# Patient Record
Sex: FEMALE | Race: WHITE | Employment: OTHER | URBAN - METROPOLITAN AREA
[De-identification: names, ages, dates, MRNs, and addresses within clinical notes are randomized per-mention and may not be internally consistent; named-entity substitution may affect disease eponyms.]

---

## 2019-12-13 ENCOUNTER — OFFICE VISIT (OUTPATIENT)
Dept: OBGYN CLINIC | Facility: CLINIC | Age: 74
End: 2019-12-13
Payer: MEDICARE

## 2019-12-13 VITALS
HEIGHT: 57.5 IN | DIASTOLIC BLOOD PRESSURE: 62 MMHG | WEIGHT: 107.81 LBS | SYSTOLIC BLOOD PRESSURE: 130 MMHG | HEART RATE: 64 BPM | BODY MASS INDEX: 22.94 KG/M2

## 2019-12-13 DIAGNOSIS — Z12.39 BREAST CANCER SCREENING: ICD-10-CM

## 2019-12-13 DIAGNOSIS — Z12.31 ENCOUNTER FOR SCREENING MAMMOGRAM FOR MALIGNANT NEOPLASM OF BREAST: ICD-10-CM

## 2019-12-13 DIAGNOSIS — Z01.419 WELL WOMAN EXAM WITH ROUTINE GYNECOLOGICAL EXAM: Primary | ICD-10-CM

## 2019-12-13 PROCEDURE — G0101 CA SCREEN;PELVIC/BREAST EXAM: HCPCS | Performed by: OBSTETRICS & GYNECOLOGY

## 2019-12-13 RX ORDER — ATORVASTATIN CALCIUM 10 MG/1
10 TABLET, FILM COATED ORAL NIGHTLY
COMMUNITY
End: 2019-12-30

## 2019-12-13 RX ORDER — LISINOPRIL 10 MG/1
10 TABLET ORAL DAILY
COMMUNITY
End: 2019-12-30

## 2019-12-13 NOTE — PROGRESS NOTES
[facility]  Obstetrics and Gynecology  History & Physical    CC: Patient is a new patient and here for a well woman exam     Subjective:     HPI: Zohra Enamorado is a 69year old [employer] female here for a well women exam. Patient reports doing wel Sexual Activity      Alcohol use: Never        Frequency: Never      Drug use: Never      Sexual activity: Not Currently        Partners: Male    Lifestyle      Physical activity:        Days per week: Not on file        Minutes per session: Not on file constipation  :  denies dysuria, hematuria, increased urinary frequency.    Heme: denies history of excessive bleeding or bruising      Objective:      12/13/19  1016   BP: 130/62   Pulse: 64   Weight: 107 lb 12.8 oz (48.9 kg)   Height: 57.5\"         Bod

## 2019-12-30 ENCOUNTER — OFFICE VISIT (OUTPATIENT)
Dept: FAMILY MEDICINE CLINIC | Facility: CLINIC | Age: 74
End: 2019-12-30
Payer: MEDICARE

## 2019-12-30 VITALS
WEIGHT: 110 LBS | OXYGEN SATURATION: 95 % | HEART RATE: 83 BPM | DIASTOLIC BLOOD PRESSURE: 58 MMHG | BODY MASS INDEX: 23.09 KG/M2 | TEMPERATURE: 98 F | HEIGHT: 57.68 IN | SYSTOLIC BLOOD PRESSURE: 118 MMHG

## 2019-12-30 DIAGNOSIS — Z23 NEED FOR VACCINATION: ICD-10-CM

## 2019-12-30 DIAGNOSIS — I10 ESSENTIAL HYPERTENSION: ICD-10-CM

## 2019-12-30 DIAGNOSIS — Z00.00 MEDICARE ANNUAL WELLNESS VISIT, INITIAL: Primary | ICD-10-CM

## 2019-12-30 DIAGNOSIS — R21 RASH: ICD-10-CM

## 2019-12-30 DIAGNOSIS — E78.00 HYPERCHOLESTEROLEMIA: ICD-10-CM

## 2019-12-30 DIAGNOSIS — E11.9 TYPE 2 DIABETES MELLITUS WITHOUT COMPLICATION, WITHOUT LONG-TERM CURRENT USE OF INSULIN (HCC): ICD-10-CM

## 2019-12-30 PROCEDURE — 99214 OFFICE O/P EST MOD 30 MIN: CPT | Performed by: FAMILY MEDICINE

## 2019-12-30 PROCEDURE — G0009 ADMIN PNEUMOCOCCAL VACCINE: HCPCS | Performed by: FAMILY MEDICINE

## 2019-12-30 PROCEDURE — G0438 PPPS, INITIAL VISIT: HCPCS | Performed by: FAMILY MEDICINE

## 2019-12-30 PROCEDURE — 90670 PCV13 VACCINE IM: CPT | Performed by: FAMILY MEDICINE

## 2019-12-30 RX ORDER — METFORMIN HYDROCHLORIDE 500 MG/1
500 TABLET, EXTENDED RELEASE ORAL
Qty: 90 TABLET | Refills: 0 | Status: SHIPPED | OUTPATIENT
Start: 2019-12-30 | End: 2020-07-22

## 2019-12-30 RX ORDER — LISINOPRIL 10 MG/1
10 TABLET ORAL NIGHTLY
Qty: 90 TABLET | Refills: 1 | Status: SHIPPED | OUTPATIENT
Start: 2019-12-30 | End: 2020-06-16

## 2019-12-30 RX ORDER — ATORVASTATIN CALCIUM 10 MG/1
10 TABLET, FILM COATED ORAL NIGHTLY
Qty: 90 TABLET | Refills: 1 | Status: SHIPPED | OUTPATIENT
Start: 2019-12-30 | End: 2020-06-16

## 2019-12-30 RX ORDER — METHYLPREDNISOLONE 4 MG/1
TABLET ORAL
Qty: 1 KIT | Refills: 0 | Status: SHIPPED | OUTPATIENT
Start: 2019-12-30 | End: 2020-09-02 | Stop reason: ALTCHOICE

## 2019-12-30 NOTE — PROGRESS NOTES
HPI:   Steven Canales is a 69year old female who presents for a Medicare Initial Annual Wellness visit (Once after 12 month Medicare anniversary) Type 2 diabetes, hypertension, hyperlipidemia, rash. New patient. Type 2 Diabetes:  Status uncertain. hopeless (over the last two weeks)?: Several days  PHQ-2 SCORE: 1     Advanced Directive:   She does NOT have a Living Will on file in [address]    Advance care planning including the explanation and discussion of advance directives standard forms performed Fac one daily dose q am with food or milk for 6 days.   metFORMIN HCl  MG Oral Tablet 24 Hr, Take 1 tablet (500 mg total) by mouth daily with breakfast.       MEDICAL INFORMATION:   She  has a past medical history of Diabetes ([address] HTN (hypertension), Hy canals, both ears   Nose: Nares normal, septum midline,mucosa normal, no drainage or sinus tenderness   Throat:  Moist mucous membranes.   Posterior oropharynx normal.   Neck: Supple, symmetrical, trachea midline, no adenopathy;  thyroid: not enlarged, symm review. Continue metformin. Continue ACE inhibitor for renal protection. Continue atorvastatin. - CBC WITH DIFFERENTIAL WITH PLATELET; Future  - COMP METABOLIC PANEL (14); Future  - LIPID PANEL;  Future  - ASSAY, THYROID STIM HORMONE; Future  - FREE T dose as one daily dose q am with food or milk for 6 days.    • metFORMIN HCl  MG Oral Tablet 24 Hr 90 tablet 0     Sig: Take 1 tablet (500 mg total) by mouth daily with breakfast.       Imaging & Consults:  PNEUMOCOCCAL VACC, 13 REN IM      PLAN SUMMA Visit Annually: Diabetics, FHx Glaucoma, AA>50, > 65 Data entered on: 11/19/2019   Last Dilated Eye Exam 11/19/2019       Bone Density Screening      Dexascan Every two years No results found for this or any previous visit. No flowsheet data found. anticonvulsants.)    Potassium  Annually Potassium (mmol/L)   Date Value   12/31/2019 4.7    No flowsheet data found. Creatinine  Annually Creatinine (mg/dL)   Date Value   12/31/2019 0.88    No flowsheet data found.     Drug Serum Conc  Annually No resu

## 2019-12-31 ENCOUNTER — LAB ENCOUNTER (OUTPATIENT)
Dept: LAB | Age: 74
End: 2019-12-31
Attending: FAMILY MEDICINE
Payer: MEDICARE

## 2019-12-31 DIAGNOSIS — E11.9 TYPE 2 DIABETES MELLITUS WITHOUT COMPLICATION, WITHOUT LONG-TERM CURRENT USE OF INSULIN (HCC): ICD-10-CM

## 2019-12-31 LAB
ALBUMIN SERPL-MCNC: 3.6 G/DL (ref 3.4–5)
ALBUMIN/GLOB SERPL: 0.9 {RATIO} (ref 1–2)
ALP LIVER SERPL-CCNC: 118 U/L (ref 55–142)
ALT SERPL-CCNC: 27 U/L (ref 13–56)
ANION GAP SERPL CALC-SCNC: 4 MMOL/L (ref 0–18)
AST SERPL-CCNC: 19 U/L (ref 15–37)
BASOPHILS # BLD AUTO: 0.06 X10(3) UL (ref 0–0.2)
BASOPHILS NFR BLD AUTO: 0.7 %
BILIRUB SERPL-MCNC: 0.6 MG/DL (ref 0.1–2)
BUN BLD-MCNC: 16 MG/DL (ref 7–18)
BUN/CREAT SERPL: 18.2 (ref 10–20)
CALCIUM BLD-MCNC: 9.3 MG/DL (ref 8.5–10.1)
CHLORIDE SERPL-SCNC: 106 MMOL/L (ref 98–112)
CHOLEST SMN-MCNC: 231 MG/DL (ref ?–200)
CO2 SERPL-SCNC: 28 MMOL/L (ref 21–32)
CREAT BLD-MCNC: 0.88 MG/DL (ref 0.55–1.02)
CREAT UR-SCNC: 57.6 MG/DL
DEPRECATED RDW RBC AUTO: 48.5 FL (ref 35.1–46.3)
EOSINOPHIL # BLD AUTO: 0.32 X10(3) UL (ref 0–0.7)
EOSINOPHIL NFR BLD AUTO: 3.9 %
ERYTHROCYTE [DISTWIDTH] IN BLOOD BY AUTOMATED COUNT: 14.3 % (ref 11–15)
EST. AVERAGE GLUCOSE BLD GHB EST-MCNC: 134 MG/DL (ref 68–126)
GLOBULIN PLAS-MCNC: 3.8 G/DL (ref 2.8–4.4)
GLUCOSE BLD-MCNC: 99 MG/DL (ref 70–99)
HBA1C MFR BLD HPLC: 6.3 % (ref ?–5.7)
HCT VFR BLD AUTO: 42.2 % (ref 35–48)
HDLC SERPL-MCNC: 73 MG/DL (ref 40–59)
HGB BLD-MCNC: 13.4 G/DL (ref 12–16)
IMM GRANULOCYTES # BLD AUTO: 0.03 X10(3) UL (ref 0–1)
IMM GRANULOCYTES NFR BLD: 0.4 %
LDLC SERPL CALC-MCNC: 128 MG/DL (ref ?–100)
LYMPHOCYTES # BLD AUTO: 1.12 X10(3) UL (ref 1–4)
LYMPHOCYTES NFR BLD AUTO: 13.7 %
M PROTEIN MFR SERPL ELPH: 7.4 G/DL (ref 6.4–8.2)
MCH RBC QN AUTO: 29.7 PG (ref 26–34)
MCHC RBC AUTO-ENTMCNC: 31.8 G/DL (ref 31–37)
MCV RBC AUTO: 93.6 FL (ref 80–100)
MICROALBUMIN UR-MCNC: <0.5 MG/DL
MONOCYTES # BLD AUTO: 0.46 X10(3) UL (ref 0.1–1)
MONOCYTES NFR BLD AUTO: 5.6 %
NEUTROPHILS # BLD AUTO: 6.19 X10 (3) UL (ref 1.5–7.7)
NEUTROPHILS # BLD AUTO: 6.19 X10(3) UL (ref 1.5–7.7)
NEUTROPHILS NFR BLD AUTO: 75.7 %
NONHDLC SERPL-MCNC: 158 MG/DL (ref ?–130)
OSMOLALITY SERPL CALC.SUM OF ELEC: 287 MOSM/KG (ref 275–295)
PATIENT FASTING Y/N/NP: YES
PATIENT FASTING Y/N/NP: YES
PLATELET # BLD AUTO: 229 10(3)UL (ref 150–450)
POTASSIUM SERPL-SCNC: 4.7 MMOL/L (ref 3.5–5.1)
RBC # BLD AUTO: 4.51 X10(6)UL (ref 3.8–5.3)
SODIUM SERPL-SCNC: 138 MMOL/L (ref 136–145)
T4 FREE SERPL-MCNC: 0.9 NG/DL (ref 0.8–1.7)
TRIGL SERPL-MCNC: 148 MG/DL (ref 30–149)
TSI SER-ACNC: 1.17 MIU/ML (ref 0.36–3.74)
VLDLC SERPL CALC-MCNC: 30 MG/DL (ref 0–30)
WBC # BLD AUTO: 8.2 X10(3) UL (ref 4–11)

## 2019-12-31 PROCEDURE — 36415 COLL VENOUS BLD VENIPUNCTURE: CPT

## 2019-12-31 PROCEDURE — 84443 ASSAY THYROID STIM HORMONE: CPT

## 2019-12-31 PROCEDURE — 85025 COMPLETE CBC W/AUTO DIFF WBC: CPT

## 2019-12-31 PROCEDURE — 82043 UR ALBUMIN QUANTITATIVE: CPT

## 2019-12-31 PROCEDURE — 80061 LIPID PANEL: CPT

## 2019-12-31 PROCEDURE — 82570 ASSAY OF URINE CREATININE: CPT

## 2019-12-31 PROCEDURE — 80053 COMPREHEN METABOLIC PANEL: CPT

## 2019-12-31 PROCEDURE — 84439 ASSAY OF FREE THYROXINE: CPT

## 2019-12-31 PROCEDURE — 83036 HEMOGLOBIN GLYCOSYLATED A1C: CPT

## 2020-01-07 ENCOUNTER — MED REC SCAN ONLY (OUTPATIENT)
Dept: FAMILY MEDICINE CLINIC | Facility: CLINIC | Age: 75
End: 2020-01-07

## 2020-06-16 DIAGNOSIS — E11.9 TYPE 2 DIABETES MELLITUS WITHOUT COMPLICATION, WITHOUT LONG-TERM CURRENT USE OF INSULIN (HCC): ICD-10-CM

## 2020-06-16 DIAGNOSIS — I10 ESSENTIAL HYPERTENSION: ICD-10-CM

## 2020-06-16 DIAGNOSIS — E78.00 HYPERCHOLESTEROLEMIA: ICD-10-CM

## 2020-06-16 RX ORDER — ATORVASTATIN CALCIUM 10 MG/1
TABLET, FILM COATED ORAL
Qty: 90 TABLET | Refills: 0 | Status: SHIPPED | OUTPATIENT
Start: 2020-06-16 | End: 2020-07-20

## 2020-06-16 RX ORDER — LISINOPRIL 10 MG/1
TABLET ORAL
Qty: 90 TABLET | Refills: 0 | Status: SHIPPED | OUTPATIENT
Start: 2020-06-16 | End: 2020-09-02

## 2020-06-16 NOTE — TELEPHONE ENCOUNTER
Pt requesting refill of LISINOPRIL and ATORVASTATIN     Passed protocol, refill approved, sent to pharmacy

## 2020-07-17 DIAGNOSIS — I10 ESSENTIAL HYPERTENSION: ICD-10-CM

## 2020-07-17 DIAGNOSIS — E78.00 HYPERCHOLESTEROLEMIA: ICD-10-CM

## 2020-07-17 DIAGNOSIS — E11.9 TYPE 2 DIABETES MELLITUS WITHOUT COMPLICATION, WITHOUT LONG-TERM CURRENT USE OF INSULIN (HCC): ICD-10-CM

## 2020-07-20 RX ORDER — ATORVASTATIN CALCIUM 10 MG/1
TABLET, FILM COATED ORAL
Qty: 90 TABLET | Refills: 0 | Status: SHIPPED | OUTPATIENT
Start: 2020-07-20 | End: 2020-09-02

## 2020-07-20 RX ORDER — LISINOPRIL 10 MG/1
TABLET ORAL
Qty: 90 TABLET | Refills: 0 | OUTPATIENT
Start: 2020-07-20

## 2020-07-22 DIAGNOSIS — E11.9 TYPE 2 DIABETES MELLITUS WITHOUT COMPLICATION, WITHOUT LONG-TERM CURRENT USE OF INSULIN (HCC): ICD-10-CM

## 2020-07-22 RX ORDER — METFORMIN HYDROCHLORIDE 500 MG/1
500 TABLET, EXTENDED RELEASE ORAL
Qty: 90 TABLET | Refills: 0 | Status: SHIPPED | OUTPATIENT
Start: 2020-07-22 | End: 2020-09-02

## 2020-07-22 NOTE — TELEPHONE ENCOUNTER
Refill request for metformin. Did not meet protocol d/t no appointment in past 6 months. Called patient and spoke to daughter. She stated they changed insurance and doesn't think Dr. Shelbie Lange is covered anymore.   Verified with [facility] [place] that we do not accept

## 2020-09-02 ENCOUNTER — OFFICE VISIT (OUTPATIENT)
Dept: FAMILY MEDICINE CLINIC | Facility: CLINIC | Age: 75
End: 2020-09-02
Payer: MEDICAID

## 2020-09-02 ENCOUNTER — DOCUMENTATION ONLY (OUTPATIENT)
Dept: FAMILY MEDICINE CLINIC | Facility: CLINIC | Age: 75
End: 2020-09-02

## 2020-09-02 VITALS
HEART RATE: 71 BPM | SYSTOLIC BLOOD PRESSURE: 122 MMHG | WEIGHT: 120.63 LBS | TEMPERATURE: 98 F | HEIGHT: 57.68 IN | DIASTOLIC BLOOD PRESSURE: 60 MMHG | BODY MASS INDEX: 25.32 KG/M2 | OXYGEN SATURATION: 98 %

## 2020-09-02 DIAGNOSIS — I10 ESSENTIAL HYPERTENSION: ICD-10-CM

## 2020-09-02 DIAGNOSIS — J30.89 PERENNIAL ALLERGIC RHINITIS WITH SEASONAL VARIATION: ICD-10-CM

## 2020-09-02 DIAGNOSIS — J30.2 PERENNIAL ALLERGIC RHINITIS WITH SEASONAL VARIATION: ICD-10-CM

## 2020-09-02 DIAGNOSIS — E78.00 HYPERCHOLESTEROLEMIA: ICD-10-CM

## 2020-09-02 DIAGNOSIS — E11.9 TYPE 2 DIABETES MELLITUS WITHOUT COMPLICATION, WITHOUT LONG-TERM CURRENT USE OF INSULIN (HCC): Primary | ICD-10-CM

## 2020-09-02 LAB
CARTRIDGE LOT#: 564 NUMERIC
HEMOGLOBIN A1C: 6.1 % (ref 4.3–5.6)

## 2020-09-02 PROCEDURE — 99214 OFFICE O/P EST MOD 30 MIN: CPT | Performed by: FAMILY MEDICINE

## 2020-09-02 PROCEDURE — 3074F SYST BP LT 130 MM HG: CPT | Performed by: FAMILY MEDICINE

## 2020-09-02 PROCEDURE — 83036 HEMOGLOBIN GLYCOSYLATED A1C: CPT | Performed by: FAMILY MEDICINE

## 2020-09-02 PROCEDURE — 3008F BODY MASS INDEX DOCD: CPT | Performed by: FAMILY MEDICINE

## 2020-09-02 PROCEDURE — 93000 ELECTROCARDIOGRAM COMPLETE: CPT | Performed by: FAMILY MEDICINE

## 2020-09-02 PROCEDURE — 3078F DIAST BP <80 MM HG: CPT | Performed by: FAMILY MEDICINE

## 2020-09-02 RX ORDER — LISINOPRIL 10 MG/1
10 TABLET ORAL NIGHTLY
Qty: 90 TABLET | Refills: 3 | Status: SHIPPED | OUTPATIENT
Start: 2020-09-02 | End: 2021-09-07

## 2020-09-02 RX ORDER — LISINOPRIL 10 MG/1
10 TABLET ORAL EVERY EVENING
Qty: 90 TABLET | Refills: 0 | Status: CANCELLED | OUTPATIENT
Start: 2020-09-02

## 2020-09-02 RX ORDER — BLOOD SUGAR DIAGNOSTIC
STRIP MISCELLANEOUS
Qty: 100 STRIP | Refills: 3 | Status: SHIPPED | OUTPATIENT
Start: 2020-09-02 | End: 2021-12-16

## 2020-09-02 RX ORDER — METFORMIN HYDROCHLORIDE 500 MG/1
500 TABLET, EXTENDED RELEASE ORAL
Qty: 90 TABLET | Refills: 3 | Status: SHIPPED | OUTPATIENT
Start: 2020-09-02 | End: 2021-09-07

## 2020-09-02 RX ORDER — LANCETS
1 EACH MISCELLANEOUS EVERY MORNING
Qty: 1 BOX | Refills: 3 | Status: SHIPPED | OUTPATIENT
Start: 2020-09-02 | End: 2021-12-16

## 2020-09-02 RX ORDER — ATORVASTATIN CALCIUM 10 MG/1
10 TABLET, FILM COATED ORAL NIGHTLY
Qty: 90 TABLET | Refills: 3 | Status: SHIPPED | OUTPATIENT
Start: 2020-09-02 | End: 2021-12-16

## 2020-09-02 RX ORDER — BLOOD-GLUCOSE METER
1 EACH MISCELLANEOUS EVERY MORNING
Qty: 1 KIT | Refills: 0 | Status: SHIPPED | OUTPATIENT
Start: 2020-09-02 | End: 2021-12-16

## 2020-09-02 NOTE — PATIENT INSTRUCTIONS
-Please try to complete the screening mammogram that Dr. Jackee Nyhan ordered.    -For nasal allergies, try taking over-the-counter Clarinex or Zyrtec at bedtime until the first frost.    -You can check your blood sugar daily in the morning befor

## 2020-09-02 NOTE — PROGRESS NOTES
HPI:   Nelly Martinez is a 77year old female    Patient is accompanied by her niece who is pleasant and supportive.         Immunization History  Administered            Date(s) Administered    FLUAD High Dose 72 yr and older (68979) heartburn  EXTREMITIES: denies swelling, numbness and tingling  NEURO: denies headaches  ENDO:  Denies polyuria/polydypsia/heat or cold intolerance  PSYCH:  Denies depression or anxiety    EXAM:   /60 (BP Location: Left arm, Patient Position: Sitting Cholesterol, Total      <200 mg/dL  231 (H)   HDL Cholesterol      40 - 59 mg/dL  73 (H)   Triglycerides      30 - 149 mg/dL  148   LDL Cholesterol Calc      <100 mg/dL  128 (H)   VLDL      0 - 30 mg/dL  30   NON HDL CHOL      <130 mg/dL  158 (H)   HEMOG Dispense: 1 kit; Refill: 0  - Glucose Blood (ACCU-CHEK RICKY PLUS) In Vitro Strip; Use as directed. Dispense: 100 strip; Refill: 3  - Accu-Chek Softclix Lancets Does not apply Misc; 1 lancet by Finger stick route every morning. Use as directed.   Dispense: apply Kit 1 kit 0     Si Device by Other route every morning. • Glucose Blood (ACCU-CHEK RICKY PLUS) In Vitro Strip 100 strip 3     Sig: Use as directed.    • Accu-Chek Softclix Lancets Does not apply Misc 1 Box 3     Si lancet by Finger stick rou

## 2020-11-03 ENCOUNTER — LAB ENCOUNTER (OUTPATIENT)
Dept: LAB | Age: 75
End: 2020-11-03
Attending: FAMILY MEDICINE
Payer: MEDICARE

## 2020-11-03 DIAGNOSIS — J30.89 PERENNIAL ALLERGIC RHINITIS WITH SEASONAL VARIATION: ICD-10-CM

## 2020-11-03 DIAGNOSIS — E78.00 HYPERCHOLESTEROLEMIA: ICD-10-CM

## 2020-11-03 DIAGNOSIS — E11.9 TYPE 2 DIABETES MELLITUS WITHOUT COMPLICATION, WITHOUT LONG-TERM CURRENT USE OF INSULIN (HCC): ICD-10-CM

## 2020-11-03 DIAGNOSIS — I10 ESSENTIAL HYPERTENSION: ICD-10-CM

## 2020-11-03 DIAGNOSIS — J30.2 PERENNIAL ALLERGIC RHINITIS WITH SEASONAL VARIATION: ICD-10-CM

## 2020-11-03 PROCEDURE — 82785 ASSAY OF IGE: CPT

## 2020-11-03 PROCEDURE — 85025 COMPLETE CBC W/AUTO DIFF WBC: CPT

## 2020-11-03 PROCEDURE — 82570 ASSAY OF URINE CREATININE: CPT

## 2020-11-03 PROCEDURE — 86003 ALLG SPEC IGE CRUDE XTRC EA: CPT

## 2020-11-03 PROCEDURE — 80061 LIPID PANEL: CPT

## 2020-11-03 PROCEDURE — 36415 COLL VENOUS BLD VENIPUNCTURE: CPT

## 2020-11-03 PROCEDURE — 80053 COMPREHEN METABOLIC PANEL: CPT

## 2020-11-03 PROCEDURE — 84443 ASSAY THYROID STIM HORMONE: CPT

## 2020-11-03 PROCEDURE — 84439 ASSAY OF FREE THYROXINE: CPT

## 2020-11-03 PROCEDURE — 82043 UR ALBUMIN QUANTITATIVE: CPT

## 2020-11-13 ENCOUNTER — TELEPHONE (OUTPATIENT)
Dept: FAMILY MEDICINE CLINIC | Facility: CLINIC | Age: 75
End: 2020-11-13

## 2020-12-17 ENCOUNTER — OFFICE VISIT (OUTPATIENT)
Dept: FAMILY MEDICINE CLINIC | Facility: CLINIC | Age: 75
End: 2020-12-17
Payer: MEDICAID

## 2020-12-17 DIAGNOSIS — U07.1 COVID-19: Primary | ICD-10-CM

## 2020-12-17 DIAGNOSIS — R06.00 DOE (DYSPNEA ON EXERTION): ICD-10-CM

## 2020-12-17 PROCEDURE — 99214 OFFICE O/P EST MOD 30 MIN: CPT | Performed by: FAMILY MEDICINE

## 2020-12-17 RX ORDER — ALBUTEROL SULFATE 90 UG/1
2 AEROSOL, METERED RESPIRATORY (INHALATION) EVERY 6 HOURS PRN
Qty: 1 INHALER | Refills: 0 | Status: SHIPPED | OUTPATIENT
Start: 2020-12-17 | End: 2021-01-25

## 2020-12-17 NOTE — PROGRESS NOTES
Virtual/Telephone Check-In    Harika Matias is a 77year old female here today for a telemedicine audio only visit. HPI:       1. COVID-19  2.  PACKER (dyspnea on exertion)  -diagnosed 1 month ago  -cough and fevers resolved  -however, continues to have Refill   • atorvastatin 10 MG Oral Tab Take 1 tablet (10 mg total) by mouth nightly.  90 tablet 3   • metFORMIN HCl  MG Oral Tablet 24 Hr Take 1 tablet (500 mg total) by mouth daily with breakfast. 90 tablet 3   • lisinopril 10 MG Oral Tab Take 1 tabl

## 2021-01-25 ENCOUNTER — OFFICE VISIT (OUTPATIENT)
Dept: FAMILY MEDICINE CLINIC | Facility: CLINIC | Age: 76
End: 2021-01-25
Payer: MEDICARE

## 2021-01-25 VITALS
WEIGHT: 118 LBS | TEMPERATURE: 98 F | SYSTOLIC BLOOD PRESSURE: 122 MMHG | BODY MASS INDEX: 24.43 KG/M2 | OXYGEN SATURATION: 98 % | HEIGHT: 58.27 IN | HEART RATE: 82 BPM | DIASTOLIC BLOOD PRESSURE: 60 MMHG

## 2021-01-25 DIAGNOSIS — I10 ESSENTIAL HYPERTENSION: ICD-10-CM

## 2021-01-25 DIAGNOSIS — E78.00 HYPERCHOLESTEROLEMIA: ICD-10-CM

## 2021-01-25 DIAGNOSIS — J30.2 PERENNIAL ALLERGIC RHINITIS WITH SEASONAL VARIATION: ICD-10-CM

## 2021-01-25 DIAGNOSIS — B94.8 POST-VIRAL DISORDER: ICD-10-CM

## 2021-01-25 DIAGNOSIS — Z00.00 ENCOUNTER FOR ANNUAL HEALTH EXAMINATION: Primary | ICD-10-CM

## 2021-01-25 DIAGNOSIS — Z78.0 POST-MENOPAUSE: ICD-10-CM

## 2021-01-25 DIAGNOSIS — Z12.11 ENCOUNTER FOR SCREENING FOR MALIGNANT NEOPLASM OF COLON: ICD-10-CM

## 2021-01-25 DIAGNOSIS — E11.9 TYPE 2 DIABETES MELLITUS WITHOUT COMPLICATION, WITHOUT LONG-TERM CURRENT USE OF INSULIN (HCC): ICD-10-CM

## 2021-01-25 DIAGNOSIS — Z11.59 NEED FOR HEPATITIS C SCREENING TEST: ICD-10-CM

## 2021-01-25 DIAGNOSIS — Z12.31 ENCOUNTER FOR SCREENING MAMMOGRAM FOR MALIGNANT NEOPLASM OF BREAST: ICD-10-CM

## 2021-01-25 DIAGNOSIS — R06.00 DOE (DYSPNEA ON EXERTION): ICD-10-CM

## 2021-01-25 DIAGNOSIS — J30.89 PERENNIAL ALLERGIC RHINITIS WITH SEASONAL VARIATION: ICD-10-CM

## 2021-01-25 PROCEDURE — 96160 PT-FOCUSED HLTH RISK ASSMT: CPT | Performed by: FAMILY MEDICINE

## 2021-01-25 PROCEDURE — 3074F SYST BP LT 130 MM HG: CPT | Performed by: FAMILY MEDICINE

## 2021-01-25 PROCEDURE — 99397 PER PM REEVAL EST PAT 65+ YR: CPT | Performed by: FAMILY MEDICINE

## 2021-01-25 PROCEDURE — 3078F DIAST BP <80 MM HG: CPT | Performed by: FAMILY MEDICINE

## 2021-01-25 PROCEDURE — 3008F BODY MASS INDEX DOCD: CPT | Performed by: FAMILY MEDICINE

## 2021-01-25 PROCEDURE — G0439 PPPS, SUBSEQ VISIT: HCPCS | Performed by: FAMILY MEDICINE

## 2021-01-25 RX ORDER — ALBUTEROL SULFATE 90 UG/1
2 AEROSOL, METERED RESPIRATORY (INHALATION) EVERY 6 HOURS PRN
Qty: 1 INHALER | Refills: 0 | Status: SHIPPED | OUTPATIENT
Start: 2021-01-25 | End: 2021-03-03

## 2021-01-25 NOTE — PATIENT INSTRUCTIONS
Va a lab en ayunas     Llama para citas de las pruebas    Regresa en 6 meses, antes si Sanchez Negrete's SCREENING SCHEDULE   Tests on this list are recommended by your physician but may not be covered, or covered at this frequency, by your ins a family history    Colorectal Cancer Screening  Covered up to Age 76     Colonoscopy Screen   Covered every 10 years- more often if abnormal Colonoscopy due on 08/27/1996 Update Health Maintenance if applicable    Flex Sigmoidoscopy Screen  Covered every after 65 Orders placed or performed in visit on 12/30/19   • PNEUMOCOCCAL VACC, 13 REN IM    Please get once after your 65th birthday    Pneumococcal 23 (Pneumovax)  Covered Once after 65 No orders found for this or any previous visit.  Please get once afte

## 2021-01-26 NOTE — PROGRESS NOTES
HPI:   Boo Simeon is a 77year old female who presents for a MA (Medicare Advantage) Supervisit (Once per calendar year). Annual Physical due on 01/25/2022     1. Encounter for annual health examination  2.  Encounter for screening for malignant neop and Family/surrogate (if present), and forms available to patient in AVS       She has never smoked tobacco.    CAGE Alcohol screening   Latoyameri Dooley was screened for Alcohol abuse and had a score of 0 so is at low risk.     Patient Care Team: Patient Car Softclix Lancets Does not apply Misc, 1 lancet by Finger stick route every morning. Use as directed. MEDICAL INFORMATION:   She  has a past medical history of Diabetes ([address] HTN (hypertension), Hypercholesteremia, and Prediabetes.     She  has a past no organomegaly   Pelvic: Deferred   Extremities: Extremities normal, atraumatic, no cyanosis or edema   Pulses: 2+ and symmetric   Skin: Skin color, texture, turgor normal, no rashes or lesions   Lymph nodes: Cervical, supraclavicular, and axillary nodes Inhalation Aero Soln; Inhale 2 puffs into the lungs every 6 (six) hours as needed for Wheezing (or cough).       Perennial allergic rhinitis with seasonal variation  -stable, will continue to monitor           Diet assessment: good     PLAN:  The patient in Eye Exam 11/19/2019       Bone Density Screening      Dexascan Every two years No results found for this or any previous visit. No flowsheet data found.     Pap and Pelvic      Pap: Every 3 yrs age 21-65 or Pap+HPV every 5 yrs age 33-67, age 72 and older at Creatinine  Annually Creatinine (mg/dL)   Date Value   11/03/2020 0.94    No flowsheet data found. Drug Serum Conc  Annually No results found for: DIGOXIN, DIG, VALP No flowsheet data found.        Diabetes      HgbA1C  Annually HEMOGLOBIN A1C (%)   Date

## 2021-02-04 ENCOUNTER — HOSPITAL ENCOUNTER (OUTPATIENT)
Dept: BONE DENSITY | Age: 76
Discharge: HOME | End: 2021-02-04
Attending: FAMILY MEDICINE
Payer: MEDICARE

## 2021-02-04 DIAGNOSIS — Z78.0 POST-MENOPAUSE: ICD-10-CM

## 2021-02-04 PROCEDURE — 77080 DXA BONE DENSITY AXIAL: CPT | Performed by: FAMILY MEDICINE

## 2021-02-08 ENCOUNTER — HOSPITAL ENCOUNTER (OUTPATIENT)
Dept: MAMMOGRAPHY | Age: 76
Discharge: HOME | End: 2021-02-08
Attending: FAMILY MEDICINE
Payer: MEDICARE

## 2021-02-08 DIAGNOSIS — Z78.0 POST-MENOPAUSE: ICD-10-CM

## 2021-02-08 DIAGNOSIS — M81.0 OSTEOPOROSIS, UNSPECIFIED OSTEOPOROSIS TYPE, UNSPECIFIED PATHOLOGICAL FRACTURE PRESENCE: Primary | ICD-10-CM

## 2021-02-08 DIAGNOSIS — Z12.31 ENCOUNTER FOR SCREENING MAMMOGRAM FOR MALIGNANT NEOPLASM OF BREAST: ICD-10-CM

## 2021-02-08 PROCEDURE — 77063 BREAST TOMOSYNTHESIS BI: CPT | Performed by: FAMILY MEDICINE

## 2021-02-08 PROCEDURE — 77067 SCR MAMMO BI INCL CAD: CPT | Performed by: FAMILY MEDICINE

## 2021-02-08 RX ORDER — ALENDRONATE SODIUM 70 MG/1
70 TABLET ORAL WEEKLY
Qty: 12 TABLET | Refills: 3 | Status: SHIPPED | OUTPATIENT
Start: 2021-02-08 | End: 2021-12-16

## 2021-02-12 ENCOUNTER — TELEPHONE (OUTPATIENT)
Dept: FAMILY MEDICINE CLINIC | Facility: CLINIC | Age: 76
End: 2021-02-12

## 2021-02-12 NOTE — TELEPHONE ENCOUNTER
Howard Mendoza MD   2/8/2021 11:26 AM      Bone density test shows osteoporosis     Start Calcium and Vit D supplements.  Goal is about 1200mg calcium daily (mostly through diet if possible, ok to use OTC vitamins as well) and Vit D 1000 units per day (O

## 2021-02-12 NOTE — TELEPHONE ENCOUNTER
Patient verbalized understanding and agreed with Dr. Johan Joseph instructions. She started taking medication as of Tuesday. Patient will go to store to buy vit D and calcium.    Patient unable to schedule appointment at the moment but will call back to schedule 3

## 2021-03-02 NOTE — TELEPHONE ENCOUNTER
Albuterol Sulfate  (90 Base) MCG/ACT Inhalation Aero Soln 1 Inhaler 0 1/25/2021     LOV 1/25/21- Wellness  Follow up in 6 months

## 2021-03-03 DIAGNOSIS — Z23 NEED FOR VACCINATION: ICD-10-CM

## 2021-03-03 RX ORDER — ALBUTEROL SULFATE 90 UG/1
AEROSOL, METERED RESPIRATORY (INHALATION)
Qty: 8.5 G | Refills: 0 | Status: SHIPPED | OUTPATIENT
Start: 2021-03-03

## 2021-04-26 ENCOUNTER — LAB ENCOUNTER (OUTPATIENT)
Dept: LAB | Age: 76
End: 2021-04-26
Attending: INTERNAL MEDICINE
Payer: MEDICARE

## 2021-04-26 DIAGNOSIS — Z12.11 SCREENING FOR COLON CANCER: ICD-10-CM

## 2021-04-29 ENCOUNTER — ANESTHESIA (OUTPATIENT)
Dept: ENDOSCOPY | Facility: HOSPITAL | Age: 76
End: 2021-04-29
Payer: MEDICARE

## 2021-04-29 ENCOUNTER — HOSPITAL ENCOUNTER (OUTPATIENT)
Facility: HOSPITAL | Age: 76
Setting detail: HOSPITAL OUTPATIENT SURGERY
Discharge: HOME | End: 2021-04-29
Attending: INTERNAL MEDICINE | Admitting: INTERNAL MEDICINE
Payer: MEDICARE

## 2021-04-29 ENCOUNTER — ANESTHESIA EVENT (OUTPATIENT)
Dept: ENDOSCOPY | Facility: HOSPITAL | Age: 76
End: 2021-04-29
Payer: MEDICARE

## 2021-04-29 VITALS
DIASTOLIC BLOOD PRESSURE: 71 MMHG | HEIGHT: 58 IN | TEMPERATURE: 98 F | HEART RATE: 71 BPM | OXYGEN SATURATION: 100 % | SYSTOLIC BLOOD PRESSURE: 131 MMHG | RESPIRATION RATE: 20 BRPM | BODY MASS INDEX: 19.94 KG/M2 | WEIGHT: 95 LBS

## 2021-04-29 DIAGNOSIS — Z12.11 SCREENING FOR COLON CANCER: Primary | ICD-10-CM

## 2021-04-29 PROCEDURE — 0DBN8ZX EXCISION OF SIGMOID COLON, VIA NATURAL OR ARTIFICIAL OPENING ENDOSCOPIC, DIAGNOSTIC: ICD-10-PCS | Performed by: INTERNAL MEDICINE

## 2021-04-29 PROCEDURE — 0DBK8ZX EXCISION OF ASCENDING COLON, VIA NATURAL OR ARTIFICIAL OPENING ENDOSCOPIC, DIAGNOSTIC: ICD-10-PCS | Performed by: INTERNAL MEDICINE

## 2021-04-29 PROCEDURE — 88305 TISSUE EXAM BY PATHOLOGIST: CPT | Performed by: INTERNAL MEDICINE

## 2021-04-29 PROCEDURE — 0DBL8ZX EXCISION OF TRANSVERSE COLON, VIA NATURAL OR ARTIFICIAL OPENING ENDOSCOPIC, DIAGNOSTIC: ICD-10-PCS | Performed by: INTERNAL MEDICINE

## 2021-04-29 PROCEDURE — 82962 GLUCOSE BLOOD TEST: CPT

## 2021-04-29 RX ORDER — LIDOCAINE HYDROCHLORIDE 10 MG/ML
INJECTION, SOLUTION EPIDURAL; INFILTRATION; INTRACAUDAL; PERINEURAL AS NEEDED
Status: DISCONTINUED | OUTPATIENT
Start: 2021-04-29 | End: 2021-04-29 | Stop reason: SURG

## 2021-04-29 RX ORDER — DEXTROSE MONOHYDRATE 25 G/50ML
50 INJECTION, SOLUTION INTRAVENOUS
Status: DISCONTINUED | OUTPATIENT
Start: 2021-04-29 | End: 2021-04-29

## 2021-04-29 RX ORDER — SODIUM CHLORIDE, SODIUM LACTATE, POTASSIUM CHLORIDE, CALCIUM CHLORIDE 600; 310; 30; 20 MG/100ML; MG/100ML; MG/100ML; MG/100ML
INJECTION, SOLUTION INTRAVENOUS CONTINUOUS
Status: DISCONTINUED | OUTPATIENT
Start: 2021-04-29 | End: 2021-04-29

## 2021-04-29 RX ADMIN — LIDOCAINE HYDROCHLORIDE 100 MG: 10 INJECTION, SOLUTION EPIDURAL; INFILTRATION; INTRACAUDAL; PERINEURAL at 14:59:00

## 2021-04-29 NOTE — H&P
[facility] Patient Status:  Hospital Outpatient Surgery    1945 MRN [id number]   Location [id number] [place] [address] Attending Stefanie Dupree MD   Date 2021 PCP Micehlle Mckee MD     CC:  Sc dry.  Laboratory Data:  Lab Results   Component Value Date    PGLU 106 04/29/2021         Assessment/Plan/Procedure:  Patient Active Problem List:     Type 2 diabetes mellitus without complication, without long-term current use of insulin (HCC)     Rash

## 2021-04-29 NOTE — ANESTHESIA POSTPROCEDURE EVALUATION
[facility] Patient Status:  Hospital Outpatient Surgery   Age/Gender 77year old female MRN [id number]   Location [address] [address] Attending Aubrey Dial MD   Hosp Day # 0 PCP Mary Rodriguez MD       Anesth

## 2021-04-29 NOTE — OPERATIVE REPORT
[facility] Patient Status:  Hospital Outpatient Surgery    1945 MRN [id number]   Location [id number] [place] [address] Attending Deondre Pelletier MD   Date 2021 PCP Lorena Nuñez MD     PREOPERATIVE DIAGNOSIS/INDICATION: Ulysees [place] polypectomy, moderate left sided diverticulosis  RECOMMENDATIONS:   - Post Colonoscopy/polypectomy precautions, watch for bleeding, infection, perforation, adverse drug reactions   - Follow biopsies.  - Repeat colonoscopy in 1 year with 2 day preparation

## 2021-04-29 NOTE — ANESTHESIA PREPROCEDURE EVALUATION
PRE-OP EVALUATION    Patient Name: Linsey Solis    Admit Diagnosis: Screening for colon cancer [Z12.11]    Pre-op Diagnosis: Screening for colon cancer [Z12.11]    COLONOSCOPY    Anesthesia Procedure: COLONOSCOPY (N/A )    Surgeon(s) and Role:     * Pand PLUS) w/Device Does not apply Kit, 1 Device by Other route every morning., Disp: 1 kit, Rfl: 0  Glucose Blood (ACCU-CHEK RICKY PLUS) In Vitro Strip, Use as directed., Disp: 100 strip, Rfl: 3  Accu-Chek Softclix Lancets Does not apply Misc, 1 lancet by [facility]

## 2021-05-03 NOTE — PROGRESS NOTES
Date: 5/3/2021    To: Lacie Scheuermann  : 1945     I hope this letter finds you doing well. I am writing to inform you of the following:         The biopsies obtained from your recent colonoscopy indicate that the polyps were adenomas which, although

## 2021-05-25 ENCOUNTER — LAB ENCOUNTER (OUTPATIENT)
Dept: LAB | Age: 76
End: 2021-05-25
Attending: FAMILY MEDICINE
Payer: MEDICARE

## 2021-05-25 DIAGNOSIS — Z11.59 NEED FOR HEPATITIS C SCREENING TEST: ICD-10-CM

## 2021-05-25 DIAGNOSIS — I10 ESSENTIAL HYPERTENSION: ICD-10-CM

## 2021-05-25 DIAGNOSIS — E78.00 HYPERCHOLESTEROLEMIA: ICD-10-CM

## 2021-05-25 DIAGNOSIS — E11.9 TYPE 2 DIABETES MELLITUS WITHOUT COMPLICATION, WITHOUT LONG-TERM CURRENT USE OF INSULIN (HCC): ICD-10-CM

## 2021-05-25 PROCEDURE — 86803 HEPATITIS C AB TEST: CPT

## 2021-05-25 PROCEDURE — 3051F HG A1C>EQUAL 7.0%<8.0%: CPT | Performed by: FAMILY MEDICINE

## 2021-05-25 PROCEDURE — 3061F NEG MICROALBUMINURIA REV: CPT | Performed by: FAMILY MEDICINE

## 2021-05-25 PROCEDURE — 83036 HEMOGLOBIN GLYCOSYLATED A1C: CPT

## 2021-05-25 PROCEDURE — 82570 ASSAY OF URINE CREATININE: CPT

## 2021-05-25 PROCEDURE — 36415 COLL VENOUS BLD VENIPUNCTURE: CPT

## 2021-05-25 PROCEDURE — 80053 COMPREHEN METABOLIC PANEL: CPT

## 2021-05-25 PROCEDURE — 80061 LIPID PANEL: CPT

## 2021-05-25 PROCEDURE — 82043 UR ALBUMIN QUANTITATIVE: CPT

## 2021-09-04 DIAGNOSIS — I10 ESSENTIAL HYPERTENSION: ICD-10-CM

## 2021-09-04 DIAGNOSIS — E11.9 TYPE 2 DIABETES MELLITUS WITHOUT COMPLICATION, WITHOUT LONG-TERM CURRENT USE OF INSULIN (HCC): ICD-10-CM

## 2021-09-07 RX ORDER — LISINOPRIL 10 MG/1
TABLET ORAL
Qty: 30 TABLET | Refills: 0 | Status: SHIPPED | OUTPATIENT
Start: 2021-09-07 | End: 2021-12-16

## 2021-09-07 RX ORDER — METFORMIN HYDROCHLORIDE 500 MG/1
TABLET, EXTENDED RELEASE ORAL
Qty: 30 TABLET | Refills: 0 | Status: SHIPPED | OUTPATIENT
Start: 2021-09-07 | End: 2021-12-16

## 2021-12-16 ENCOUNTER — OFFICE VISIT (OUTPATIENT)
Dept: FAMILY MEDICINE CLINIC | Facility: CLINIC | Age: 76
End: 2021-12-16
Payer: MEDICARE

## 2021-12-16 VITALS
HEART RATE: 68 BPM | OXYGEN SATURATION: 98 % | WEIGHT: 124 LBS | BODY MASS INDEX: 26.03 KG/M2 | TEMPERATURE: 98 F | SYSTOLIC BLOOD PRESSURE: 112 MMHG | HEIGHT: 58 IN | DIASTOLIC BLOOD PRESSURE: 60 MMHG

## 2021-12-16 DIAGNOSIS — K21.9 GASTROESOPHAGEAL REFLUX DISEASE WITHOUT ESOPHAGITIS: ICD-10-CM

## 2021-12-16 DIAGNOSIS — E46 PROTEIN-CALORIE MALNUTRITION, UNSPECIFIED SEVERITY (HCC): ICD-10-CM

## 2021-12-16 DIAGNOSIS — Z78.0 POST-MENOPAUSE: ICD-10-CM

## 2021-12-16 DIAGNOSIS — M81.0 OSTEOPOROSIS, UNSPECIFIED OSTEOPOROSIS TYPE, UNSPECIFIED PATHOLOGICAL FRACTURE PRESENCE: ICD-10-CM

## 2021-12-16 DIAGNOSIS — E11.9 TYPE 2 DIABETES MELLITUS WITHOUT COMPLICATION, WITHOUT LONG-TERM CURRENT USE OF INSULIN (HCC): Primary | ICD-10-CM

## 2021-12-16 DIAGNOSIS — E78.00 HYPERCHOLESTEROLEMIA: ICD-10-CM

## 2021-12-16 DIAGNOSIS — I10 ESSENTIAL HYPERTENSION: ICD-10-CM

## 2021-12-16 PROBLEM — N18.31 CHRONIC KIDNEY DISEASE, STAGE 3A (HCC): Status: ACTIVE | Noted: 2021-12-16

## 2021-12-16 PROCEDURE — 3008F BODY MASS INDEX DOCD: CPT | Performed by: FAMILY MEDICINE

## 2021-12-16 PROCEDURE — 3078F DIAST BP <80 MM HG: CPT | Performed by: FAMILY MEDICINE

## 2021-12-16 PROCEDURE — 3074F SYST BP LT 130 MM HG: CPT | Performed by: FAMILY MEDICINE

## 2021-12-16 PROCEDURE — 99215 OFFICE O/P EST HI 40 MIN: CPT | Performed by: FAMILY MEDICINE

## 2021-12-16 RX ORDER — LISINOPRIL 10 MG/1
10 TABLET ORAL EVERY EVENING
Qty: 90 TABLET | Refills: 2 | Status: SHIPPED | OUTPATIENT
Start: 2021-12-16

## 2021-12-16 RX ORDER — ALBUTEROL SULFATE 90 UG/1
2 AEROSOL, METERED RESPIRATORY (INHALATION) EVERY 6 HOURS PRN
Qty: 8.5 G | Refills: 0 | Status: CANCELLED | OUTPATIENT
Start: 2021-12-16

## 2021-12-16 RX ORDER — METFORMIN HYDROCHLORIDE 500 MG/1
500 TABLET, EXTENDED RELEASE ORAL
Qty: 90 TABLET | Refills: 2 | Status: SHIPPED | OUTPATIENT
Start: 2021-12-16

## 2021-12-16 RX ORDER — ALENDRONATE SODIUM 70 MG/1
70 TABLET ORAL WEEKLY
Qty: 12 TABLET | Refills: 3 | Status: SHIPPED | OUTPATIENT
Start: 2021-12-16

## 2021-12-16 RX ORDER — FAMOTIDINE 20 MG/1
20 TABLET ORAL 2 TIMES DAILY
Qty: 180 TABLET | Refills: 1 | Status: SHIPPED | OUTPATIENT
Start: 2021-12-16

## 2021-12-16 RX ORDER — ATORVASTATIN CALCIUM 10 MG/1
10 TABLET, FILM COATED ORAL NIGHTLY
Qty: 90 TABLET | Refills: 3 | Status: SHIPPED | OUTPATIENT
Start: 2021-12-16

## 2021-12-16 NOTE — PROGRESS NOTES
Easton Kilpatrick is a 77year old female here for Patient presents with:  Heartburn: x 6 months      HPI:     1.  Type 2 diabetes mellitus without complication, without long-term current use of insulin (HCC)  -sugars good  -due for labs  -tolerating metformin Smoking status: Never Smoker      Smokeless tobacco: Never Used    Vaping Use      Vaping Use: Never used    Alcohol use: Never    Drug use: Never       Medications (Active prior to today's visit):  Current Outpatient Medications   Medication Sig Dispense labs    - metFORMIN HCl  MG Oral Tablet 24 Hr; Take 1 tablet (500 mg total) by mouth daily with breakfast.  Dispense: 90 tablet; Refill: 2  - lisinopril 10 MG Oral Tab; Take 1 tablet (10 mg total) by mouth every evening. Dispense: 90 tablet;  Refill: Panel (14) [E]      Microalb/Creat Ratio, Random Urine [E]      Hemoglobin A1C      Lipid Panel      Meds This Visit:  Requested Prescriptions     Signed Prescriptions Disp Refills   • metFORMIN HCl  MG Oral Tablet 24 Hr 90 tablet 2     Sig: Take 1 t

## 2021-12-16 NOTE — PATIENT INSTRUCTIONS
Continua todo de luna medicamento    Aggregar famotidine 30 minutos antes de comer 2 veces al arnulfo    Va a lab para chavo y [place] en ayunas     Regresa en 6 meses, antes si necessita

## 2021-12-17 ENCOUNTER — LAB ENCOUNTER (OUTPATIENT)
Dept: LAB | Age: 76
End: 2021-12-17
Attending: FAMILY MEDICINE
Payer: MEDICARE

## 2021-12-17 DIAGNOSIS — I10 ESSENTIAL HYPERTENSION: ICD-10-CM

## 2021-12-17 DIAGNOSIS — E78.00 HYPERCHOLESTEROLEMIA: ICD-10-CM

## 2021-12-17 DIAGNOSIS — E11.9 TYPE 2 DIABETES MELLITUS WITHOUT COMPLICATION, WITHOUT LONG-TERM CURRENT USE OF INSULIN (HCC): ICD-10-CM

## 2021-12-17 PROCEDURE — 82570 ASSAY OF URINE CREATININE: CPT

## 2021-12-17 PROCEDURE — 80061 LIPID PANEL: CPT

## 2021-12-17 PROCEDURE — 80053 COMPREHEN METABOLIC PANEL: CPT

## 2021-12-17 PROCEDURE — 36415 COLL VENOUS BLD VENIPUNCTURE: CPT

## 2021-12-17 PROCEDURE — 82043 UR ALBUMIN QUANTITATIVE: CPT

## 2021-12-17 PROCEDURE — 83036 HEMOGLOBIN GLYCOSYLATED A1C: CPT

## 2022-06-09 RX ORDER — FAMOTIDINE 20 MG/1
TABLET, FILM COATED ORAL
Qty: 180 TABLET | Refills: 0 | Status: SHIPPED | OUTPATIENT
Start: 2022-06-09

## 2022-06-30 NOTE — PATIENT INSTRUCTIONS
Pt parents report that pt was doing back flips in the laundry room. PT father reports that the table in the laundry room fell onto pt right arm, PT CMS intact, obvious deformity noted to the right wrist. PT VSS and ABC's intact   Recommended Websites for Advanced Directives    SeekAlumni.no. org/publications/Documents/personal_dec. pdf  An information packet, including necessary form from the [address] website. http://www. [place]state. [place]/public/books/adv

## 2022-08-23 ENCOUNTER — OFFICE VISIT (OUTPATIENT)
Dept: FAMILY MEDICINE CLINIC | Facility: CLINIC | Age: 77
End: 2022-08-23
Payer: MEDICARE

## 2022-08-23 VITALS
WEIGHT: 124 LBS | BODY MASS INDEX: 26.75 KG/M2 | OXYGEN SATURATION: 96 % | DIASTOLIC BLOOD PRESSURE: 60 MMHG | SYSTOLIC BLOOD PRESSURE: 134 MMHG | TEMPERATURE: 97 F | HEIGHT: 57.09 IN | HEART RATE: 80 BPM

## 2022-08-23 DIAGNOSIS — K21.9 GASTROESOPHAGEAL REFLUX DISEASE WITHOUT ESOPHAGITIS: ICD-10-CM

## 2022-08-23 DIAGNOSIS — I10 ESSENTIAL HYPERTENSION: ICD-10-CM

## 2022-08-23 DIAGNOSIS — E78.00 HYPERCHOLESTEROLEMIA: ICD-10-CM

## 2022-08-23 DIAGNOSIS — R21 RASH: ICD-10-CM

## 2022-08-23 DIAGNOSIS — N18.31 CHRONIC KIDNEY DISEASE, STAGE 3A (HCC): ICD-10-CM

## 2022-08-23 DIAGNOSIS — N18.31 TYPE 2 DIABETES MELLITUS WITH STAGE 3A CHRONIC KIDNEY DISEASE, WITHOUT LONG-TERM CURRENT USE OF INSULIN (HCC): ICD-10-CM

## 2022-08-23 DIAGNOSIS — I87.1: ICD-10-CM

## 2022-08-23 DIAGNOSIS — J30.2 PERENNIAL ALLERGIC RHINITIS WITH SEASONAL VARIATION: ICD-10-CM

## 2022-08-23 DIAGNOSIS — J30.89 PERENNIAL ALLERGIC RHINITIS WITH SEASONAL VARIATION: ICD-10-CM

## 2022-08-23 DIAGNOSIS — Z00.00 ENCOUNTER FOR ANNUAL HEALTH EXAMINATION: Primary | ICD-10-CM

## 2022-08-23 DIAGNOSIS — E11.9 TYPE 2 DIABETES MELLITUS WITHOUT COMPLICATION, WITHOUT LONG-TERM CURRENT USE OF INSULIN (HCC): ICD-10-CM

## 2022-08-23 DIAGNOSIS — R22.1 NECK SWELLING: ICD-10-CM

## 2022-08-23 DIAGNOSIS — E46 PROTEIN-CALORIE MALNUTRITION, UNSPECIFIED SEVERITY (HCC): ICD-10-CM

## 2022-08-23 DIAGNOSIS — M81.0 OSTEOPOROSIS, UNSPECIFIED OSTEOPOROSIS TYPE, UNSPECIFIED PATHOLOGICAL FRACTURE PRESENCE: ICD-10-CM

## 2022-08-23 DIAGNOSIS — E11.22 TYPE 2 DIABETES MELLITUS WITH STAGE 3A CHRONIC KIDNEY DISEASE, WITHOUT LONG-TERM CURRENT USE OF INSULIN (HCC): ICD-10-CM

## 2022-08-23 PROCEDURE — G0439 PPPS, SUBSEQ VISIT: HCPCS | Performed by: FAMILY MEDICINE

## 2022-08-23 PROCEDURE — 1126F AMNT PAIN NOTED NONE PRSNT: CPT | Performed by: FAMILY MEDICINE

## 2022-08-23 PROCEDURE — 99397 PER PM REEVAL EST PAT 65+ YR: CPT | Performed by: FAMILY MEDICINE

## 2022-08-23 PROCEDURE — 3075F SYST BP GE 130 - 139MM HG: CPT | Performed by: FAMILY MEDICINE

## 2022-08-23 PROCEDURE — 3078F DIAST BP <80 MM HG: CPT | Performed by: FAMILY MEDICINE

## 2022-08-23 PROCEDURE — 3008F BODY MASS INDEX DOCD: CPT | Performed by: FAMILY MEDICINE

## 2022-08-23 PROCEDURE — 96160 PT-FOCUSED HLTH RISK ASSMT: CPT | Performed by: FAMILY MEDICINE

## 2022-08-23 RX ORDER — TRIAMCINOLONE ACETONIDE 1 MG/G
CREAM TOPICAL
Qty: 45 G | Refills: 0 | Status: SHIPPED | OUTPATIENT
Start: 2022-08-23

## 2022-08-26 ENCOUNTER — LAB ENCOUNTER (OUTPATIENT)
Dept: LAB | Age: 77
End: 2022-08-26
Attending: FAMILY MEDICINE
Payer: MEDICARE

## 2022-08-26 DIAGNOSIS — E11.9 TYPE 2 DIABETES MELLITUS WITHOUT COMPLICATION, WITHOUT LONG-TERM CURRENT USE OF INSULIN (HCC): ICD-10-CM

## 2022-08-26 DIAGNOSIS — I10 ESSENTIAL HYPERTENSION: ICD-10-CM

## 2022-08-26 LAB
ALBUMIN SERPL-MCNC: 3.8 G/DL (ref 3.4–5)
ALBUMIN/GLOB SERPL: 1 {RATIO} (ref 1–2)
ALP LIVER SERPL-CCNC: 107 U/L
ALT SERPL-CCNC: 23 U/L
ANION GAP SERPL CALC-SCNC: 4 MMOL/L (ref 0–18)
AST SERPL-CCNC: 12 U/L (ref 15–37)
BASOPHILS # BLD AUTO: 0.08 X10(3) UL (ref 0–0.2)
BASOPHILS NFR BLD AUTO: 1 %
BILIRUB SERPL-MCNC: 0.5 MG/DL (ref 0.1–2)
BUN BLD-MCNC: 18 MG/DL (ref 7–18)
CALCIUM BLD-MCNC: 9.4 MG/DL (ref 8.5–10.1)
CHLORIDE SERPL-SCNC: 106 MMOL/L (ref 98–112)
CHOLEST SERPL-MCNC: 173 MG/DL (ref ?–200)
CO2 SERPL-SCNC: 27 MMOL/L (ref 21–32)
CREAT BLD-MCNC: 0.96 MG/DL
CREAT UR-SCNC: 39.4 MG/DL
EOSINOPHIL # BLD AUTO: 0.3 X10(3) UL (ref 0–0.7)
EOSINOPHIL NFR BLD AUTO: 3.8 %
ERYTHROCYTE [DISTWIDTH] IN BLOOD BY AUTOMATED COUNT: 13.6 %
EST. AVERAGE GLUCOSE BLD GHB EST-MCNC: 174 MG/DL (ref 68–126)
FASTING PATIENT LIPID ANSWER: YES
FASTING STATUS PATIENT QL REPORTED: YES
GFR SERPLBLD BASED ON 1.73 SQ M-ARVRAT: 62 ML/MIN/1.73M2 (ref 60–?)
GLOBULIN PLAS-MCNC: 3.8 G/DL (ref 2.8–4.4)
GLUCOSE BLD-MCNC: 145 MG/DL (ref 70–99)
HBA1C MFR BLD: 7.7 % (ref ?–5.7)
HCT VFR BLD AUTO: 42.8 %
HDLC SERPL-MCNC: 54 MG/DL (ref 40–59)
HGB BLD-MCNC: 13.4 G/DL
IMM GRANULOCYTES # BLD AUTO: 0.03 X10(3) UL (ref 0–1)
IMM GRANULOCYTES NFR BLD: 0.4 %
LDLC SERPL CALC-MCNC: 74 MG/DL (ref ?–100)
LYMPHOCYTES # BLD AUTO: 2.01 X10(3) UL (ref 1–4)
LYMPHOCYTES NFR BLD AUTO: 25.6 %
MCH RBC QN AUTO: 29.5 PG (ref 26–34)
MCHC RBC AUTO-ENTMCNC: 31.3 G/DL (ref 31–37)
MCV RBC AUTO: 94.3 FL
MICROALBUMIN UR-MCNC: <0.5 MG/DL
MONOCYTES # BLD AUTO: 0.47 X10(3) UL (ref 0.1–1)
MONOCYTES NFR BLD AUTO: 6 %
NEUTROPHILS # BLD AUTO: 4.96 X10 (3) UL (ref 1.5–7.7)
NEUTROPHILS # BLD AUTO: 4.96 X10(3) UL (ref 1.5–7.7)
NEUTROPHILS NFR BLD AUTO: 63.2 %
NONHDLC SERPL-MCNC: 119 MG/DL (ref ?–130)
OSMOLALITY SERPL CALC.SUM OF ELEC: 288 MOSM/KG (ref 275–295)
PLATELET # BLD AUTO: 183 10(3)UL (ref 150–450)
POTASSIUM SERPL-SCNC: 4.4 MMOL/L (ref 3.5–5.1)
PROT SERPL-MCNC: 7.6 G/DL (ref 6.4–8.2)
RBC # BLD AUTO: 4.54 X10(6)UL
SODIUM SERPL-SCNC: 137 MMOL/L (ref 136–145)
TRIGL SERPL-MCNC: 277 MG/DL (ref 30–149)
TSI SER-ACNC: 3.17 MIU/ML (ref 0.36–3.74)
VLDLC SERPL CALC-MCNC: 43 MG/DL (ref 0–30)
WBC # BLD AUTO: 7.9 X10(3) UL (ref 4–11)

## 2022-08-26 PROCEDURE — 82570 ASSAY OF URINE CREATININE: CPT

## 2022-08-26 PROCEDURE — 84443 ASSAY THYROID STIM HORMONE: CPT

## 2022-08-26 PROCEDURE — 85025 COMPLETE CBC W/AUTO DIFF WBC: CPT

## 2022-08-26 PROCEDURE — 36415 COLL VENOUS BLD VENIPUNCTURE: CPT

## 2022-08-26 PROCEDURE — 82043 UR ALBUMIN QUANTITATIVE: CPT

## 2022-08-26 PROCEDURE — 83036 HEMOGLOBIN GLYCOSYLATED A1C: CPT

## 2022-08-26 PROCEDURE — 80053 COMPREHEN METABOLIC PANEL: CPT

## 2022-08-26 PROCEDURE — 80061 LIPID PANEL: CPT

## 2022-09-03 DIAGNOSIS — E11.9 TYPE 2 DIABETES MELLITUS WITHOUT COMPLICATION, WITHOUT LONG-TERM CURRENT USE OF INSULIN (HCC): ICD-10-CM

## 2022-09-03 DIAGNOSIS — I10 ESSENTIAL HYPERTENSION: ICD-10-CM

## 2022-09-06 RX ORDER — LISINOPRIL 10 MG/1
TABLET ORAL
Qty: 90 TABLET | Refills: 0 | Status: SHIPPED | OUTPATIENT
Start: 2022-09-06

## 2022-09-06 RX ORDER — BLOOD SUGAR DIAGNOSTIC
STRIP MISCELLANEOUS
Qty: 100 STRIP | Refills: 0 | Status: SHIPPED | OUTPATIENT
Start: 2022-09-06

## 2022-09-06 RX ORDER — BLOOD-GLUCOSE METER
EACH MISCELLANEOUS
Qty: 1 KIT | Refills: 0 | Status: SHIPPED | OUTPATIENT
Start: 2022-09-06

## 2022-09-06 RX ORDER — LANCETS
EACH MISCELLANEOUS
Qty: 100 EACH | Refills: 0 | Status: SHIPPED | OUTPATIENT
Start: 2022-09-06

## 2022-09-06 RX ORDER — METFORMIN HYDROCHLORIDE 500 MG/1
TABLET, EXTENDED RELEASE ORAL
Qty: 90 TABLET | Refills: 0 | Status: SHIPPED | OUTPATIENT
Start: 2022-09-06

## 2022-09-08 ENCOUNTER — TELEPHONE (OUTPATIENT)
Dept: FAMILY MEDICINE CLINIC | Facility: CLINIC | Age: 77
End: 2022-09-08

## 2022-09-08 DIAGNOSIS — E11.9 TYPE 2 DIABETES MELLITUS WITHOUT COMPLICATION, WITHOUT LONG-TERM CURRENT USE OF INSULIN (HCC): Primary | ICD-10-CM

## 2022-09-08 RX ORDER — LANCETS 33 GAUGE
1 EACH MISCELLANEOUS DAILY
Qty: 100 EACH | Refills: 3 | Status: SHIPPED | OUTPATIENT
Start: 2022-09-08 | End: 2023-09-08

## 2022-09-08 RX ORDER — BLOOD SUGAR DIAGNOSTIC
STRIP MISCELLANEOUS
Qty: 100 STRIP | Refills: 3 | Status: SHIPPED | OUTPATIENT
Start: 2022-09-08 | End: 2023-09-08

## 2022-09-08 RX ORDER — BLOOD-GLUCOSE METER
1 EACH MISCELLANEOUS DAILY
Qty: 1 KIT | Refills: 0 | Status: SHIPPED | OUTPATIENT
Start: 2022-09-08

## 2022-09-08 NOTE — TELEPHONE ENCOUNTER
Incoming fax from [address] states only Onetouch monitor is covered by plan. Sent supplies for onetouch.

## 2022-09-09 ENCOUNTER — HOSPITAL ENCOUNTER (OUTPATIENT)
Dept: ULTRASOUND IMAGING | Age: 77
Discharge: HOME | End: 2022-09-09
Attending: FAMILY MEDICINE
Payer: MEDICARE

## 2022-09-09 DIAGNOSIS — R22.1 NECK SWELLING: ICD-10-CM

## 2022-09-09 PROCEDURE — 76536 US EXAM OF HEAD AND NECK: CPT | Performed by: FAMILY MEDICINE

## 2022-09-09 RX ORDER — FAMOTIDINE 20 MG/1
TABLET, FILM COATED ORAL
Qty: 180 TABLET | Refills: 0 | Status: SHIPPED | OUTPATIENT
Start: 2022-09-09

## 2022-09-13 ENCOUNTER — TELEPHONE (OUTPATIENT)
Dept: FAMILY MEDICINE CLINIC | Facility: CLINIC | Age: 77
End: 2022-09-13

## 2022-09-13 DIAGNOSIS — R22.2 MASS IN CHEST: Primary | ICD-10-CM

## 2022-09-13 DIAGNOSIS — I87.1: ICD-10-CM

## 2022-09-13 NOTE — TELEPHONE ENCOUNTER
Ordered plain CT of chest - STAT    Reason for STAT exam is for approval purposes, as patient is scheduled tomorrow for CTA - would like her to keep that spot for this test    Thanks

## 2022-09-13 NOTE — TELEPHONE ENCOUNTER
CT Chest is still pending authorization. Patient notified that other scan (CTA) was denied. She knows not to complete test.  Referral dept will check auth in the morning for the CT chest.   I advised patient I would be in contact with her in the morning.

## 2022-09-14 NOTE — TELEPHONE ENCOUNTER
Communicated with patient through  Sanna Pruitt  #[id number]    Patient informed, verbalized understanding. CXR already scheduled for 9/24/22.

## 2022-09-14 NOTE — TELEPHONE ENCOUNTER
Referral dept states Peer to Peer is required for CT Chest. Please see referral for further details. You may also schedule a [id number] discussion regarding the case. You may schedule a   discussion with a Medical Director by calling [phone], select the prompt associated with   the request type and enter in reference number [id number]. You also have the opportunity to   schedule a Peer to Peer discussion online at [email or web address].  Search for the   case by using the Authorization Lookup tab, and then go to P2P Availability, to schedule the   discussion

## 2022-09-20 ENCOUNTER — ANESTHESIA (OUTPATIENT)
Dept: ENDOSCOPY | Facility: HOSPITAL | Age: 77
End: 2022-09-20

## 2022-09-20 ENCOUNTER — ANESTHESIA EVENT (OUTPATIENT)
Dept: ENDOSCOPY | Facility: HOSPITAL | Age: 77
End: 2022-09-20

## 2022-09-20 ENCOUNTER — HOSPITAL ENCOUNTER (OUTPATIENT)
Facility: HOSPITAL | Age: 77
Setting detail: HOSPITAL OUTPATIENT SURGERY
Discharge: HOME | End: 2022-09-20
Attending: INTERNAL MEDICINE | Admitting: INTERNAL MEDICINE

## 2022-09-20 VITALS
WEIGHT: 124 LBS | HEIGHT: 57 IN | OXYGEN SATURATION: 100 % | TEMPERATURE: 98 F | SYSTOLIC BLOOD PRESSURE: 134 MMHG | RESPIRATION RATE: 18 BRPM | DIASTOLIC BLOOD PRESSURE: 88 MMHG | HEART RATE: 70 BPM | BODY MASS INDEX: 26.75 KG/M2

## 2022-09-20 DIAGNOSIS — Z86.010 PERSONAL HISTORY OF COLONIC POLYPS: ICD-10-CM

## 2022-09-20 LAB — GLUCOSE BLD-MCNC: 125 MG/DL (ref 70–99)

## 2022-09-20 PROCEDURE — 0DBN8ZX EXCISION OF SIGMOID COLON, VIA NATURAL OR ARTIFICIAL OPENING ENDOSCOPIC, DIAGNOSTIC: ICD-10-PCS | Performed by: INTERNAL MEDICINE

## 2022-09-20 PROCEDURE — 0DBL8ZX EXCISION OF TRANSVERSE COLON, VIA NATURAL OR ARTIFICIAL OPENING ENDOSCOPIC, DIAGNOSTIC: ICD-10-PCS | Performed by: INTERNAL MEDICINE

## 2022-09-20 PROCEDURE — 88305 TISSUE EXAM BY PATHOLOGIST: CPT | Performed by: INTERNAL MEDICINE

## 2022-09-20 PROCEDURE — 0DBH8ZX EXCISION OF CECUM, VIA NATURAL OR ARTIFICIAL OPENING ENDOSCOPIC, DIAGNOSTIC: ICD-10-PCS | Performed by: INTERNAL MEDICINE

## 2022-09-20 PROCEDURE — 82962 GLUCOSE BLOOD TEST: CPT

## 2022-09-20 RX ORDER — NALOXONE HYDROCHLORIDE 0.4 MG/ML
80 INJECTION, SOLUTION INTRAMUSCULAR; INTRAVENOUS; SUBCUTANEOUS AS NEEDED
Status: DISCONTINUED | OUTPATIENT
Start: 2022-09-20 | End: 2022-09-20

## 2022-09-20 RX ORDER — SODIUM CHLORIDE, SODIUM LACTATE, POTASSIUM CHLORIDE, CALCIUM CHLORIDE 600; 310; 30; 20 MG/100ML; MG/100ML; MG/100ML; MG/100ML
INJECTION, SOLUTION INTRAVENOUS CONTINUOUS
Status: DISCONTINUED | OUTPATIENT
Start: 2022-09-20 | End: 2022-09-20

## 2022-09-20 RX ORDER — DEXTROSE MONOHYDRATE 25 G/50ML
50 INJECTION, SOLUTION INTRAVENOUS
Status: DISCONTINUED | OUTPATIENT
Start: 2022-09-20 | End: 2022-09-20

## 2022-09-20 RX ORDER — NICOTINE POLACRILEX 4 MG
15 LOZENGE BUCCAL
Status: DISCONTINUED | OUTPATIENT
Start: 2022-09-20 | End: 2022-09-20

## 2022-09-20 RX ORDER — NICOTINE POLACRILEX 4 MG
30 LOZENGE BUCCAL
Status: DISCONTINUED | OUTPATIENT
Start: 2022-09-20 | End: 2022-09-20

## 2022-09-20 RX ORDER — LIDOCAINE HYDROCHLORIDE 10 MG/ML
INJECTION, SOLUTION EPIDURAL; INFILTRATION; INTRACAUDAL; PERINEURAL AS NEEDED
Status: DISCONTINUED | OUTPATIENT
Start: 2022-09-20 | End: 2022-09-20 | Stop reason: SURG

## 2022-09-20 RX ADMIN — LIDOCAINE HYDROCHLORIDE 50 MG: 10 INJECTION, SOLUTION EPIDURAL; INFILTRATION; INTRACAUDAL; PERINEURAL at 11:46:00

## 2022-09-20 RX ADMIN — SODIUM CHLORIDE, SODIUM LACTATE, POTASSIUM CHLORIDE, CALCIUM CHLORIDE: 600; 310; 30; 20 INJECTION, SOLUTION INTRAVENOUS at 12:18:00

## 2022-09-20 NOTE — OPERATIVE REPORT
[facility] Patient Status:  Hospital Outpatient Surgery    1945 MRN [id number]   Location [id number] [place] [address] Attending Sanjeev Hawkins MD   Date 2022 PCP Suzie Natarajan MD     PREOPERATIVE DIAGNOSIS/INDICATION: H/o polyps  POSTOPERTATIVE DIAGNOSIS: Polyps, diverticulosis  PROCEDURE PERFORMED: COLONOSCOPY with biopsy and snare polypectomy  SEDATION: MAC sedation provided by General Anesthesia    TIME OUT WAS PERFORMED    INFORMED CONSENT: Risks, benefits and alternatives to the procedure were explained to the patient including but not limited to bleeding, infection, perforation, adverse drug reactions, pancreatitis and the need for hospitalization and surgery if this occurs, the patient understands and agrees to procedure. PROCEDURE DESCRIPTION: After careful digital rectal examination a pediatric colonoscope was introduced into the patients rectum, advanced pass the recto sigmoid junction, into the descending colon, splenic flexure, transverse colon, hepatic flexure, ascending colon, cecum, confirmed by landmarks, including the appendiceal orifice and ileocecal valve. Careful examination of the above described areas was performed on withdrawal of the endoscope. Retroflexion was performed on the rectum. The patient tolerated the procedure well, there were no immediate complication immediately following the procedure, and the patient was transferred to recovery in stable condition.   QUALITY OF PREPARATION: [place] Bowel Preparation Scale:            -      Right colon 3, Transverse colon 3, Left colon 3   FINDINGS/THERAPEUTICS:  - COLON: 2 mm flat cecal polyp s/p excisional biopsy with cold forceps, 5 mm sessile transverse colon polyp s/p cold snare polypectomy, 2 mm sessile transverse colon polyp s/p excisional biopsy with cold forceps 2 mm sessile sigmoid colon polyp s/p excisional biopsy with cold forceps, moderate left sided diverticulosis  RECOMMENDATIONS:   - Post Colonoscopy/polypectomy precautions, watch for bleeding, infection, perforation, adverse drug reactions   - Follow biopsies.  - Repeat colonoscopy in 3 years.   Julissa Rondon MD  9/20/2022  12:09 PM

## 2022-09-20 NOTE — ANESTHESIA POSTPROCEDURE EVALUATION
[facility] Patient Status:  Hospital Outpatient Surgery   Age/Gender 69year old female MRN [id number]   Location [address] [address] Attending Martita Herndon MD   Hosp Day # 0 PCP William Jackson MD       Anesthesia Post-op Note    COLONOSCOPY WITH FORCEP AND COLD SNARE POLYPECTOMIES    Procedure Summary     Date: 09/20/22 Room / Location: [facility] ENDOSCOPY 02 / 1404 [facility] ENDOSCOPY    Anesthesia Start: 1143 Anesthesia Stop: 1218    Procedure: COLONOSCOPY WITH FORCEP AND COLD SNARE POLYPECTOMIES (N/A ) Diagnosis:       Personal history of colonic polyps      (DIVERTICULOSIS, COLON POLYPS)    Surgeons: Martita Herndon MD Anesthesiologist: Olaf Osorio MD    Anesthesia Type: MAC ASA Status: 2          Anesthesia Type: MAC    Vitals Value Taken Time   /70 09/20/22 1218   Temp  09/20/22 1220   Pulse 69 09/20/22 1219   Resp 16 09/20/22 1220   SpO2 99 % 09/20/22 1219   Vitals shown include unvalidated device data. Patient Location: Endoscopy    Anesthesia Type: MAC    Airway Patency: patent    Postop Pain Control: adequate    Mental Status: mildly sedated but able to meaningfully participate in the post-anesthesia evaluation    Nausea/Vomiting: none    Cardiopulmonary/Hydration status: stable euvolemic    Complications: no apparent anesthesia related complications    Postop vital signs: stable    Dental Exam: Unchanged from Preop    Patient to be discharged home when criteria met.

## 2022-09-24 ENCOUNTER — HOSPITAL ENCOUNTER (OUTPATIENT)
Dept: GENERAL RADIOLOGY | Age: 77
Discharge: HOME | End: 2022-09-24
Attending: FAMILY MEDICINE

## 2022-09-24 DIAGNOSIS — R22.2 MASS IN CHEST: ICD-10-CM

## 2022-09-24 DIAGNOSIS — I87.1: ICD-10-CM

## 2022-09-24 PROCEDURE — 71046 X-RAY EXAM CHEST 2 VIEWS: CPT | Performed by: FAMILY MEDICINE

## 2022-09-26 NOTE — PROGRESS NOTES
Date: 2022    To: Faith Barksdale  : 1945     I hope this letter finds you doing well. I am writing to inform you of the following: The biopsies obtained from your recent colonoscopy indicate that the polyps were adenomas which, although benign (no evidence of cancer), are considered potentially pre-cancerous if they are left alone for many years. They were removed at the time of your colonoscopy. I am advising you to undergo repeat colonoscopy in 3 years. We will send you a reminder card when the time of your procedure is near. Please call the office at [phone] if there are any questions.     Ridge Michael M.D.

## 2022-10-06 ENCOUNTER — OFFICE VISIT (OUTPATIENT)
Dept: FAMILY MEDICINE CLINIC | Facility: CLINIC | Age: 77
End: 2022-10-06
Payer: MEDICARE

## 2022-10-06 VITALS
BODY MASS INDEX: 26.75 KG/M2 | SYSTOLIC BLOOD PRESSURE: 124 MMHG | HEIGHT: 57 IN | OXYGEN SATURATION: 98 % | DIASTOLIC BLOOD PRESSURE: 60 MMHG | TEMPERATURE: 97 F | WEIGHT: 124 LBS | HEART RATE: 82 BPM

## 2022-10-06 DIAGNOSIS — R22.2 MASS IN CHEST: ICD-10-CM

## 2022-10-06 DIAGNOSIS — J84.10 PULMONARY FIBROSIS (HCC): Primary | ICD-10-CM

## 2022-10-06 DIAGNOSIS — I87.1: ICD-10-CM

## 2022-10-06 PROCEDURE — 3074F SYST BP LT 130 MM HG: CPT | Performed by: FAMILY MEDICINE

## 2022-10-06 PROCEDURE — 90677 PCV20 VACCINE IM: CPT | Performed by: FAMILY MEDICINE

## 2022-10-06 PROCEDURE — 3008F BODY MASS INDEX DOCD: CPT | Performed by: FAMILY MEDICINE

## 2022-10-06 PROCEDURE — 3078F DIAST BP <80 MM HG: CPT | Performed by: FAMILY MEDICINE

## 2022-10-06 PROCEDURE — 99215 OFFICE O/P EST HI 40 MIN: CPT | Performed by: FAMILY MEDICINE

## 2022-10-06 PROCEDURE — G0009 ADMIN PNEUMOCOCCAL VACCINE: HCPCS | Performed by: FAMILY MEDICINE

## 2022-10-06 NOTE — PATIENT INSTRUCTIONS
Espera juan semana para llamar de eva de CT scan de pulmones    Continua todo luna medicina    Regresa en 3 meses, antes si necessita

## 2022-10-18 ENCOUNTER — TELEPHONE (OUTPATIENT)
Dept: FAMILY MEDICINE CLINIC | Facility: CLINIC | Age: 77
End: 2022-10-18

## 2022-10-18 NOTE — TELEPHONE ENCOUNTER
Attempted to set up peer to peer to [facility]. They state peer to peer must be through [facility] and provided phone number of [phone]. Several attempts have been made to set up peer to peer but continue to be disconnected. Will try again.

## 2022-10-18 NOTE — TELEPHONE ENCOUNTER
Spoke to Maximilian Thomas about appeal. States peer to peer is not available. Expedited appeal started. Provided fax number to fax documentation. CXR and office notes have been faxed to [phone].

## 2022-10-18 NOTE — TELEPHONE ENCOUNTER
Phoned patient to let her know not to do scheduled test tomorrow.   She did say she is leaving for [facility] in November and will be gone until Feb.

## 2022-10-20 NOTE — TELEPHONE ENCOUNTER
Detailed VM left using  to tell patient OK to schedule CT but still call us so we know she received the message.

## 2022-10-20 NOTE — TELEPHONE ENCOUNTER
[facility] has authorized CT Chest.  Approval # [id number]. Authorized until 4/18/23. Notified referral dept so they can update referral. Will call patient to schedule.

## 2022-10-21 NOTE — TELEPHONE ENCOUNTER
Again, left detailed message using  that CT has been authorized and she can schedule but to also call the office so we know she received the message.

## 2022-11-28 DIAGNOSIS — E78.00 HYPERCHOLESTEROLEMIA: ICD-10-CM

## 2022-11-28 DIAGNOSIS — E11.9 TYPE 2 DIABETES MELLITUS WITHOUT COMPLICATION, WITHOUT LONG-TERM CURRENT USE OF INSULIN (HCC): ICD-10-CM

## 2022-11-28 DIAGNOSIS — M81.0 OSTEOPOROSIS, UNSPECIFIED OSTEOPOROSIS TYPE, UNSPECIFIED PATHOLOGICAL FRACTURE PRESENCE: ICD-10-CM

## 2022-11-28 DIAGNOSIS — Z78.0 POST-MENOPAUSE: ICD-10-CM

## 2022-11-28 DIAGNOSIS — I10 ESSENTIAL HYPERTENSION: ICD-10-CM

## 2022-11-29 RX ORDER — ALENDRONATE SODIUM 70 MG/1
TABLET ORAL
Qty: 12 TABLET | Refills: 0 | Status: SHIPPED | OUTPATIENT
Start: 2022-11-29

## 2022-11-29 RX ORDER — ATORVASTATIN CALCIUM 10 MG/1
TABLET, FILM COATED ORAL
Qty: 90 TABLET | Refills: 0 | Status: SHIPPED | OUTPATIENT
Start: 2022-11-29

## 2022-11-29 RX ORDER — METFORMIN HYDROCHLORIDE 500 MG/1
TABLET, EXTENDED RELEASE ORAL
Qty: 90 TABLET | Refills: 0 | Status: SHIPPED | OUTPATIENT
Start: 2022-11-29

## 2022-11-29 RX ORDER — LISINOPRIL 10 MG/1
TABLET ORAL
Qty: 90 TABLET | Refills: 0 | Status: SHIPPED | OUTPATIENT
Start: 2022-11-29

## 2022-11-30 ENCOUNTER — PATIENT OUTREACH (OUTPATIENT)
Dept: FAMILY MEDICINE CLINIC | Facility: CLINIC | Age: 77
End: 2022-11-30

## 2022-12-15 ENCOUNTER — PATIENT OUTREACH (OUTPATIENT)
Dept: FAMILY MEDICINE CLINIC | Facility: CLINIC | Age: 77
End: 2022-12-15

## 2022-12-16 RX ORDER — FAMOTIDINE 20 MG/1
TABLET, FILM COATED ORAL
Qty: 180 TABLET | Refills: 0 | Status: SHIPPED | OUTPATIENT
Start: 2022-12-16

## 2023-02-16 ENCOUNTER — PATIENT OUTREACH (OUTPATIENT)
Dept: FAMILY MEDICINE CLINIC | Facility: CLINIC | Age: 78
End: 2023-02-16

## 2023-03-08 DIAGNOSIS — I10 ESSENTIAL HYPERTENSION: ICD-10-CM

## 2023-03-08 DIAGNOSIS — E78.00 HYPERCHOLESTEROLEMIA: ICD-10-CM

## 2023-03-08 DIAGNOSIS — E11.9 TYPE 2 DIABETES MELLITUS WITHOUT COMPLICATION, WITHOUT LONG-TERM CURRENT USE OF INSULIN (HCC): ICD-10-CM

## 2023-03-08 RX ORDER — METFORMIN HYDROCHLORIDE 500 MG/1
TABLET, EXTENDED RELEASE ORAL
Qty: 90 TABLET | Refills: 0 | Status: SHIPPED | OUTPATIENT
Start: 2023-03-08

## 2023-03-08 RX ORDER — LISINOPRIL 10 MG/1
TABLET ORAL
Qty: 90 TABLET | Refills: 0 | Status: SHIPPED | OUTPATIENT
Start: 2023-03-08

## 2023-03-08 RX ORDER — ATORVASTATIN CALCIUM 10 MG/1
TABLET, FILM COATED ORAL
Qty: 90 TABLET | Refills: 0 | Status: SHIPPED | OUTPATIENT
Start: 2023-03-08

## 2023-04-15 ENCOUNTER — HOSPITAL ENCOUNTER (OUTPATIENT)
Dept: CT IMAGING | Age: 78
Discharge: HOME | End: 2023-04-15
Attending: FAMILY MEDICINE
Payer: MEDICARE

## 2023-04-15 DIAGNOSIS — J84.10 PULMONARY FIBROSIS (HCC): ICD-10-CM

## 2023-04-15 DIAGNOSIS — R22.2 MASS IN CHEST: ICD-10-CM

## 2023-04-15 PROCEDURE — 71250 CT THORAX DX C-: CPT | Performed by: FAMILY MEDICINE

## 2023-05-02 ENCOUNTER — OFFICE VISIT (OUTPATIENT)
Dept: FAMILY MEDICINE CLINIC | Facility: CLINIC | Age: 78
End: 2023-05-02
Payer: MEDICARE

## 2023-05-02 VITALS
WEIGHT: 116 LBS | DIASTOLIC BLOOD PRESSURE: 60 MMHG | TEMPERATURE: 97 F | HEIGHT: 57.24 IN | SYSTOLIC BLOOD PRESSURE: 132 MMHG | HEART RATE: 75 BPM | BODY MASS INDEX: 25.03 KG/M2 | OXYGEN SATURATION: 98 %

## 2023-05-02 DIAGNOSIS — Z00.00 ENCOUNTER FOR ANNUAL HEALTH EXAMINATION: ICD-10-CM

## 2023-05-02 DIAGNOSIS — N18.31 CHRONIC KIDNEY DISEASE, STAGE 3A (HCC): ICD-10-CM

## 2023-05-02 DIAGNOSIS — E11.9 TYPE 2 DIABETES MELLITUS WITHOUT COMPLICATION, WITHOUT LONG-TERM CURRENT USE OF INSULIN (HCC): Primary | ICD-10-CM

## 2023-05-02 DIAGNOSIS — I10 ESSENTIAL HYPERTENSION: ICD-10-CM

## 2023-05-02 DIAGNOSIS — N18.31 TYPE 2 DIABETES MELLITUS WITH STAGE 3A CHRONIC KIDNEY DISEASE, WITHOUT LONG-TERM CURRENT USE OF INSULIN (HCC): ICD-10-CM

## 2023-05-02 DIAGNOSIS — E78.00 HYPERCHOLESTEROLEMIA: ICD-10-CM

## 2023-05-02 DIAGNOSIS — E46 PROTEIN-CALORIE MALNUTRITION, UNSPECIFIED SEVERITY (HCC): ICD-10-CM

## 2023-05-02 DIAGNOSIS — J30.2 PERENNIAL ALLERGIC RHINITIS WITH SEASONAL VARIATION: ICD-10-CM

## 2023-05-02 DIAGNOSIS — M81.0 OSTEOPOROSIS, UNSPECIFIED OSTEOPOROSIS TYPE, UNSPECIFIED PATHOLOGICAL FRACTURE PRESENCE: ICD-10-CM

## 2023-05-02 DIAGNOSIS — J30.89 PERENNIAL ALLERGIC RHINITIS WITH SEASONAL VARIATION: ICD-10-CM

## 2023-05-02 DIAGNOSIS — E11.22 TYPE 2 DIABETES MELLITUS WITH STAGE 3A CHRONIC KIDNEY DISEASE, WITHOUT LONG-TERM CURRENT USE OF INSULIN (HCC): ICD-10-CM

## 2023-05-02 DIAGNOSIS — R22.1 NECK SWELLING: ICD-10-CM

## 2023-05-02 DIAGNOSIS — K21.9 GASTROESOPHAGEAL REFLUX DISEASE WITHOUT ESOPHAGITIS: ICD-10-CM

## 2023-05-02 LAB
CARTRIDGE LOT#: 573 NUMERIC
CREAT UR-SCNC: 31.2 MG/DL
HEMOGLOBIN A1C: 7.2 % (ref 4.3–5.6)
MICROALBUMIN UR-MCNC: <0.5 MG/DL

## 2023-05-02 PROCEDURE — 83036 HEMOGLOBIN GLYCOSYLATED A1C: CPT | Performed by: FAMILY MEDICINE

## 2023-05-02 PROCEDURE — 3008F BODY MASS INDEX DOCD: CPT | Performed by: FAMILY MEDICINE

## 2023-05-02 PROCEDURE — 99214 OFFICE O/P EST MOD 30 MIN: CPT | Performed by: FAMILY MEDICINE

## 2023-05-02 PROCEDURE — 96160 PT-FOCUSED HLTH RISK ASSMT: CPT | Performed by: FAMILY MEDICINE

## 2023-05-02 PROCEDURE — 3075F SYST BP GE 130 - 139MM HG: CPT | Performed by: FAMILY MEDICINE

## 2023-05-02 PROCEDURE — 1170F FXNL STATUS ASSESSED: CPT | Performed by: FAMILY MEDICINE

## 2023-05-02 PROCEDURE — 82043 UR ALBUMIN QUANTITATIVE: CPT | Performed by: FAMILY MEDICINE

## 2023-05-02 PROCEDURE — 3078F DIAST BP <80 MM HG: CPT | Performed by: FAMILY MEDICINE

## 2023-05-02 PROCEDURE — 82570 ASSAY OF URINE CREATININE: CPT | Performed by: FAMILY MEDICINE

## 2023-05-02 PROCEDURE — 1159F MED LIST DOCD IN RCRD: CPT | Performed by: FAMILY MEDICINE

## 2023-05-02 PROCEDURE — 1125F AMNT PAIN NOTED PAIN PRSNT: CPT | Performed by: FAMILY MEDICINE

## 2023-05-02 PROCEDURE — G0439 PPPS, SUBSEQ VISIT: HCPCS | Performed by: FAMILY MEDICINE

## 2023-05-02 RX ORDER — NICOTINE POLACRILEX 4 MG/1
20 GUM, CHEWING ORAL
Qty: 180 TABLET | Refills: 1 | Status: SHIPPED | OUTPATIENT
Start: 2023-05-02 | End: 2023-06-01

## 2023-05-06 ENCOUNTER — LAB ENCOUNTER (OUTPATIENT)
Dept: LAB | Age: 78
End: 2023-05-06
Attending: FAMILY MEDICINE
Payer: MEDICARE

## 2023-05-06 DIAGNOSIS — E78.00 HYPERCHOLESTEROLEMIA: ICD-10-CM

## 2023-05-06 DIAGNOSIS — E11.9 TYPE 2 DIABETES MELLITUS WITHOUT COMPLICATION, WITHOUT LONG-TERM CURRENT USE OF INSULIN (HCC): ICD-10-CM

## 2023-05-06 DIAGNOSIS — I10 ESSENTIAL HYPERTENSION: ICD-10-CM

## 2023-05-06 LAB
ALBUMIN SERPL-MCNC: 3.9 G/DL (ref 3.4–5)
ALBUMIN/GLOB SERPL: 1.1 {RATIO} (ref 1–2)
ALP LIVER SERPL-CCNC: 119 U/L
ALT SERPL-CCNC: 21 U/L
ANION GAP SERPL CALC-SCNC: 2 MMOL/L (ref 0–18)
AST SERPL-CCNC: 21 U/L (ref 15–37)
BILIRUB SERPL-MCNC: 0.4 MG/DL (ref 0.1–2)
BUN BLD-MCNC: 15 MG/DL (ref 7–18)
CALCIUM BLD-MCNC: 9.3 MG/DL (ref 8.5–10.1)
CHLORIDE SERPL-SCNC: 106 MMOL/L (ref 98–112)
CHOLEST SERPL-MCNC: 194 MG/DL (ref ?–200)
CO2 SERPL-SCNC: 28 MMOL/L (ref 21–32)
CREAT BLD-MCNC: 0.85 MG/DL
FASTING PATIENT LIPID ANSWER: YES
FASTING STATUS PATIENT QL REPORTED: YES
GFR SERPLBLD BASED ON 1.73 SQ M-ARVRAT: 71 ML/MIN/1.73M2 (ref 60–?)
GLOBULIN PLAS-MCNC: 3.6 G/DL (ref 2.8–4.4)
GLUCOSE BLD-MCNC: 120 MG/DL (ref 70–99)
HDLC SERPL-MCNC: 63 MG/DL (ref 40–59)
LDLC SERPL CALC-MCNC: 94 MG/DL (ref ?–100)
NONHDLC SERPL-MCNC: 131 MG/DL (ref ?–130)
OSMOLALITY SERPL CALC.SUM OF ELEC: 284 MOSM/KG (ref 275–295)
POTASSIUM SERPL-SCNC: 4.5 MMOL/L (ref 3.5–5.1)
PROT SERPL-MCNC: 7.5 G/DL (ref 6.4–8.2)
SODIUM SERPL-SCNC: 136 MMOL/L (ref 136–145)
TRIGL SERPL-MCNC: 221 MG/DL (ref 30–149)
VLDLC SERPL CALC-MCNC: 36 MG/DL (ref 0–30)

## 2023-05-06 PROCEDURE — 80053 COMPREHEN METABOLIC PANEL: CPT

## 2023-05-06 PROCEDURE — 80061 LIPID PANEL: CPT

## 2023-05-06 PROCEDURE — 36415 COLL VENOUS BLD VENIPUNCTURE: CPT

## 2023-06-05 DIAGNOSIS — E11.9 TYPE 2 DIABETES MELLITUS WITHOUT COMPLICATION, WITHOUT LONG-TERM CURRENT USE OF INSULIN (HCC): ICD-10-CM

## 2023-06-05 DIAGNOSIS — E78.00 HYPERCHOLESTEROLEMIA: ICD-10-CM

## 2023-06-05 DIAGNOSIS — I10 ESSENTIAL HYPERTENSION: ICD-10-CM

## 2023-06-05 RX ORDER — METFORMIN HYDROCHLORIDE 500 MG/1
TABLET, EXTENDED RELEASE ORAL
Qty: 90 TABLET | Refills: 0 | Status: SHIPPED | OUTPATIENT
Start: 2023-06-05

## 2023-06-05 RX ORDER — LISINOPRIL 10 MG/1
TABLET ORAL
Qty: 90 TABLET | Refills: 0 | Status: SHIPPED | OUTPATIENT
Start: 2023-06-05

## 2023-06-05 RX ORDER — ATORVASTATIN CALCIUM 10 MG/1
TABLET, FILM COATED ORAL
Qty: 90 TABLET | Refills: 0 | Status: SHIPPED | OUTPATIENT
Start: 2023-06-05

## 2023-09-07 DIAGNOSIS — I10 ESSENTIAL HYPERTENSION: ICD-10-CM

## 2023-09-07 DIAGNOSIS — E78.00 HYPERCHOLESTEROLEMIA: ICD-10-CM

## 2023-09-07 DIAGNOSIS — E11.9 TYPE 2 DIABETES MELLITUS WITHOUT COMPLICATION, WITHOUT LONG-TERM CURRENT USE OF INSULIN (HCC): ICD-10-CM

## 2023-09-09 RX ORDER — ATORVASTATIN CALCIUM 10 MG/1
10 TABLET, FILM COATED ORAL EVERY EVENING
Qty: 90 TABLET | Refills: 0 | Status: SHIPPED | OUTPATIENT
Start: 2023-09-09

## 2023-09-09 RX ORDER — OMEPRAZOLE 20 MG/1
20 CAPSULE, DELAYED RELEASE ORAL
Qty: 180 CAPSULE | Refills: 0 | Status: SHIPPED | OUTPATIENT
Start: 2023-09-09

## 2023-09-09 RX ORDER — LISINOPRIL 10 MG/1
10 TABLET ORAL EVERY EVENING
Qty: 90 TABLET | Refills: 0 | Status: SHIPPED | OUTPATIENT
Start: 2023-09-09

## 2023-09-09 RX ORDER — METFORMIN HYDROCHLORIDE 500 MG/1
500 TABLET, EXTENDED RELEASE ORAL
Qty: 90 TABLET | Refills: 0 | Status: SHIPPED | OUTPATIENT
Start: 2023-09-09

## 2023-11-07 ENCOUNTER — PATIENT OUTREACH (OUTPATIENT)
Dept: FAMILY MEDICINE CLINIC | Facility: CLINIC | Age: 78
End: 2023-11-07

## 2023-11-15 ENCOUNTER — OFFICE VISIT (OUTPATIENT)
Dept: FAMILY MEDICINE CLINIC | Facility: CLINIC | Age: 78
End: 2023-11-15
Payer: MEDICARE

## 2023-11-15 VITALS
BODY MASS INDEX: 24 KG/M2 | WEIGHT: 114 LBS | DIASTOLIC BLOOD PRESSURE: 50 MMHG | HEART RATE: 73 BPM | SYSTOLIC BLOOD PRESSURE: 128 MMHG

## 2023-11-15 DIAGNOSIS — M81.0 OSTEOPOROSIS, UNSPECIFIED OSTEOPOROSIS TYPE, UNSPECIFIED PATHOLOGICAL FRACTURE PRESENCE: ICD-10-CM

## 2023-11-15 DIAGNOSIS — I10 ESSENTIAL HYPERTENSION: ICD-10-CM

## 2023-11-15 DIAGNOSIS — E78.00 HYPERCHOLESTEROLEMIA: ICD-10-CM

## 2023-11-15 DIAGNOSIS — Z78.0 POST-MENOPAUSE: ICD-10-CM

## 2023-11-15 DIAGNOSIS — E11.9 TYPE 2 DIABETES MELLITUS WITHOUT COMPLICATION, WITHOUT LONG-TERM CURRENT USE OF INSULIN (HCC): Primary | ICD-10-CM

## 2023-11-15 DIAGNOSIS — Z78.0 ASYMPTOMATIC POSTMENOPAUSAL STATE: ICD-10-CM

## 2023-11-15 DIAGNOSIS — N18.31 CHRONIC KIDNEY DISEASE, STAGE 3A (HCC): ICD-10-CM

## 2023-11-15 LAB
ALBUMIN SERPL-MCNC: 3.6 G/DL (ref 3.4–5)
ALBUMIN/GLOB SERPL: 1 {RATIO} (ref 1–2)
ALP LIVER SERPL-CCNC: 115 U/L
ALT SERPL-CCNC: 18 U/L
ANION GAP SERPL CALC-SCNC: 7 MMOL/L (ref 0–18)
AST SERPL-CCNC: 13 U/L (ref 15–37)
BILIRUB SERPL-MCNC: 0.5 MG/DL (ref 0.1–2)
BUN BLD-MCNC: 19 MG/DL (ref 9–23)
CALCIUM BLD-MCNC: 9.1 MG/DL (ref 8.5–10.1)
CARTRIDGE LOT#: 641 NUMERIC
CHLORIDE SERPL-SCNC: 107 MMOL/L (ref 98–112)
CHOLEST SERPL-MCNC: 179 MG/DL (ref ?–200)
CO2 SERPL-SCNC: 25 MMOL/L (ref 21–32)
CREAT BLD-MCNC: 1 MG/DL
EGFRCR SERPLBLD CKD-EPI 2021: 58 ML/MIN/1.73M2 (ref 60–?)
FASTING PATIENT LIPID ANSWER: NO
FASTING STATUS PATIENT QL REPORTED: NO
GLOBULIN PLAS-MCNC: 3.5 G/DL (ref 2.8–4.4)
GLUCOSE BLD-MCNC: 188 MG/DL (ref 70–99)
HDLC SERPL-MCNC: 56 MG/DL (ref 40–59)
HEMOGLOBIN A1C: 7.4 % (ref 4.3–5.6)
LDLC SERPL CALC-MCNC: 86 MG/DL (ref ?–100)
NONHDLC SERPL-MCNC: 123 MG/DL (ref ?–130)
OSMOLALITY SERPL CALC.SUM OF ELEC: 295 MOSM/KG (ref 275–295)
POTASSIUM SERPL-SCNC: 4 MMOL/L (ref 3.5–5.1)
PROT SERPL-MCNC: 7.1 G/DL (ref 6.4–8.2)
SODIUM SERPL-SCNC: 139 MMOL/L (ref 136–145)
TRIGL SERPL-MCNC: 220 MG/DL (ref 30–149)
VLDLC SERPL CALC-MCNC: 35 MG/DL (ref 0–30)

## 2023-11-15 PROCEDURE — 80061 LIPID PANEL: CPT | Performed by: FAMILY MEDICINE

## 2023-11-15 PROCEDURE — 80053 COMPREHEN METABOLIC PANEL: CPT | Performed by: FAMILY MEDICINE

## 2023-11-15 RX ORDER — METFORMIN HYDROCHLORIDE 500 MG/1
500 TABLET, EXTENDED RELEASE ORAL
Qty: 90 TABLET | Refills: 1 | Status: SHIPPED | OUTPATIENT
Start: 2023-11-15

## 2023-11-15 RX ORDER — ALENDRONATE SODIUM 70 MG/1
70 TABLET ORAL WEEKLY
Qty: 12 TABLET | Refills: 1 | Status: SHIPPED | OUTPATIENT
Start: 2023-11-15

## 2023-11-15 RX ORDER — LISINOPRIL 10 MG/1
10 TABLET ORAL EVERY EVENING
Qty: 90 TABLET | Refills: 1 | Status: SHIPPED | OUTPATIENT
Start: 2023-11-15

## 2023-11-15 RX ORDER — OMEPRAZOLE 20 MG/1
20 CAPSULE, DELAYED RELEASE ORAL
Qty: 180 CAPSULE | Refills: 1 | Status: SHIPPED | OUTPATIENT
Start: 2023-11-15

## 2023-11-15 RX ORDER — ATORVASTATIN CALCIUM 10 MG/1
10 TABLET, FILM COATED ORAL EVERY EVENING
Qty: 90 TABLET | Refills: 1 | Status: SHIPPED | OUTPATIENT
Start: 2023-11-15

## 2023-11-15 NOTE — PATIENT INSTRUCTIONS
Llama para hacer eva de bone density en 1-2 semanas    Llama para hacer eva de colt ojos    Vamos a llamar con las resultas de chavo    Regresa en 6 meses, antes si necessita

## 2024-04-16 ENCOUNTER — OFFICE VISIT (OUTPATIENT)
Dept: FAMILY MEDICINE CLINIC | Facility: CLINIC | Age: 79
End: 2024-04-16
Payer: MEDICARE

## 2024-04-16 VITALS
TEMPERATURE: 98 F | BODY MASS INDEX: 24.64 KG/M2 | HEART RATE: 59 BPM | OXYGEN SATURATION: 97 % | DIASTOLIC BLOOD PRESSURE: 60 MMHG | RESPIRATION RATE: 16 BRPM | SYSTOLIC BLOOD PRESSURE: 122 MMHG | WEIGHT: 115.81 LBS | HEIGHT: 57.5 IN

## 2024-04-16 DIAGNOSIS — I87.8 VENOUS CONGESTION: ICD-10-CM

## 2024-04-16 DIAGNOSIS — Z00.00 ROUTINE GENERAL MEDICAL EXAMINATION AT A HEALTH CARE FACILITY: Primary | ICD-10-CM

## 2024-04-16 DIAGNOSIS — M81.0 OSTEOPOROSIS, UNSPECIFIED OSTEOPOROSIS TYPE, UNSPECIFIED PATHOLOGICAL FRACTURE PRESENCE: ICD-10-CM

## 2024-04-16 DIAGNOSIS — N18.31 TYPE 2 DIABETES MELLITUS WITH STAGE 3A CHRONIC KIDNEY DISEASE, WITHOUT LONG-TERM CURRENT USE OF INSULIN (HCC): ICD-10-CM

## 2024-04-16 DIAGNOSIS — E46 PROTEIN-CALORIE MALNUTRITION, UNSPECIFIED SEVERITY (HCC): ICD-10-CM

## 2024-04-16 DIAGNOSIS — K21.9 GASTROESOPHAGEAL REFLUX DISEASE WITHOUT ESOPHAGITIS: ICD-10-CM

## 2024-04-16 DIAGNOSIS — E11.9 TYPE 2 DIABETES MELLITUS WITHOUT COMPLICATION, WITHOUT LONG-TERM CURRENT USE OF INSULIN (HCC): ICD-10-CM

## 2024-04-16 DIAGNOSIS — I99.9 VASCULAR ABNORMALITY: ICD-10-CM

## 2024-04-16 DIAGNOSIS — I10 ESSENTIAL HYPERTENSION: ICD-10-CM

## 2024-04-16 DIAGNOSIS — E11.22 TYPE 2 DIABETES MELLITUS WITH STAGE 3A CHRONIC KIDNEY DISEASE, WITHOUT LONG-TERM CURRENT USE OF INSULIN (HCC): ICD-10-CM

## 2024-04-16 DIAGNOSIS — E78.00 HYPERCHOLESTEROLEMIA: ICD-10-CM

## 2024-04-16 DIAGNOSIS — Z78.0 POST-MENOPAUSE: ICD-10-CM

## 2024-04-16 DIAGNOSIS — J84.10 PULMONARY FIBROSIS (HCC): ICD-10-CM

## 2024-04-16 DIAGNOSIS — N18.31 CHRONIC KIDNEY DISEASE, STAGE 3A (HCC): ICD-10-CM

## 2024-04-16 LAB
CREAT UR-SCNC: 47.8 MG/DL
HEMOGLOBIN A1C: 6.6 % (ref 4.3–5.6)
MICROALBUMIN UR-MCNC: <0.5 MG/DL

## 2024-04-16 PROCEDURE — 99215 OFFICE O/P EST HI 40 MIN: CPT | Performed by: FAMILY MEDICINE

## 2024-04-16 PROCEDURE — 83036 HEMOGLOBIN GLYCOSYLATED A1C: CPT | Performed by: FAMILY MEDICINE

## 2024-04-16 PROCEDURE — 82043 UR ALBUMIN QUANTITATIVE: CPT | Performed by: FAMILY MEDICINE

## 2024-04-16 PROCEDURE — 1170F FXNL STATUS ASSESSED: CPT | Performed by: FAMILY MEDICINE

## 2024-04-16 PROCEDURE — G0439 PPPS, SUBSEQ VISIT: HCPCS | Performed by: FAMILY MEDICINE

## 2024-04-16 PROCEDURE — 1159F MED LIST DOCD IN RCRD: CPT | Performed by: FAMILY MEDICINE

## 2024-04-16 PROCEDURE — 3074F SYST BP LT 130 MM HG: CPT | Performed by: FAMILY MEDICINE

## 2024-04-16 PROCEDURE — 1126F AMNT PAIN NOTED NONE PRSNT: CPT | Performed by: FAMILY MEDICINE

## 2024-04-16 PROCEDURE — 82570 ASSAY OF URINE CREATININE: CPT | Performed by: FAMILY MEDICINE

## 2024-04-16 PROCEDURE — 1160F RVW MEDS BY RX/DR IN RCRD: CPT | Performed by: FAMILY MEDICINE

## 2024-04-16 PROCEDURE — 3008F BODY MASS INDEX DOCD: CPT | Performed by: FAMILY MEDICINE

## 2024-04-16 PROCEDURE — 3078F DIAST BP <80 MM HG: CPT | Performed by: FAMILY MEDICINE

## 2024-04-16 PROCEDURE — 96160 PT-FOCUSED HLTH RISK ASSMT: CPT | Performed by: FAMILY MEDICINE

## 2024-04-16 RX ORDER — ATORVASTATIN CALCIUM 10 MG/1
10 TABLET, FILM COATED ORAL EVERY EVENING
Qty: 90 TABLET | Refills: 1 | Status: SHIPPED | OUTPATIENT
Start: 2024-04-16

## 2024-04-16 RX ORDER — LISINOPRIL 10 MG/1
10 TABLET ORAL EVERY EVENING
Qty: 90 TABLET | Refills: 1 | Status: SHIPPED | OUTPATIENT
Start: 2024-04-16

## 2024-04-16 RX ORDER — ALENDRONATE SODIUM 70 MG/1
70 TABLET ORAL WEEKLY
Qty: 12 TABLET | Refills: 1 | Status: SHIPPED | OUTPATIENT
Start: 2024-04-16

## 2024-04-16 RX ORDER — OMEPRAZOLE 20 MG/1
20 CAPSULE, DELAYED RELEASE ORAL
Qty: 180 CAPSULE | Refills: 1 | Status: SHIPPED | OUTPATIENT
Start: 2024-04-16

## 2024-04-16 RX ORDER — METFORMIN HYDROCHLORIDE 500 MG/1
500 TABLET, EXTENDED RELEASE ORAL
Qty: 90 TABLET | Refills: 1 | Status: SHIPPED | OUTPATIENT
Start: 2024-04-16

## 2024-04-16 NOTE — PROGRESS NOTES
Subjective:   Sherry Negrete is a 78 year old female who presents for a MA (Medicare Advantage) Supervisit (Once per calendar year).     Encounter for annual health examination  -due for wellness    Type 2 diabetes mellitus without complication, without long-term current use of insulin (HCC)  Type 2 diabetes mellitus with stage 3a chronic kidney disease, without long-term current use of insulin (HCC)  -Last A1c value was 6.6% done 4/16/2024.  -medication issues: none  -last eye exam: 08/05/2022  -denies hyper or hypo glycemic symptoms none    Essential hypertension  -at goal    Hypercholesterolemia  -tolerating statin    Protein-calorie malnutrition, unspecified severity (HCC)  -no issues    Chronic kidney disease, stage 3a (HCC)  -stable on last labs    Gastroesophageal reflux disease without esophagitis  -stable on PPI    Osteoporosis, unspecified osteoporosis type, unspecified pathological fracture presence  -stable on alendronate          History/Other:   Fall Risk Assessment:   She has been screened for Falls and is High Risk. Fall Prevention information provided to patient in After Visit Summary.    Do you feel unsteady when standing or walking?: No  Do you worry about falling?: Yes  Have you fallen in the past year?: No     Cognitive Assessment:   She had a completely normal cognitive assessment - see flowsheet entries     Functional Ability/Status:   Sherry Negrete has some abnormal functions as listed below:  She has Driving difficulties based on screening of functional status. She has difficulties Shopping for Groceries based on screening of functional status. She has Vision problems based on screening of functional status. She has problems with Memory based on screening of functional status.         Depression Screening (PHQ-2/PHQ-9): PHQ-2 SCORE: 0  , done 4/16/2024   Trouble concentrating on things, such as reading the newspaper or watching television: 1    If you checked off any problems, how  difficult have these problems made it for you to do your work, take care of things at home, or get along with other people?: Not difficult at all    Last [place] Suicide Screening on 4/16/2024 was No Risk.     Advanced Directives:   She does NOT have a Living Will. [Do you have a living will?: No]  She does NOT have a Power of  for Health Care. [Do you have a healthcare power of ?: No]  Discussed Advance Care Planning with patient (and family/surrogate if present). Standard forms made available to patient in After Visit Summary.      Patient Active Problem List   Diagnosis    Type 2 diabetes mellitus without complication, without long-term current use of insulin (HCC)    Rash    Hypercholesterolemia    Essential hypertension    Perennial allergic rhinitis with seasonal variation    Chronic kidney disease, stage 3a ([facility])    Protein-calorie malnutrition, unspecified severity ([facility])    Type 2 diabetes mellitus with diabetic chronic kidney disease ([facility])    Gastroesophageal reflux disease without esophagitis    Osteoporosis     Allergies:  She is allergic to adhesive tape.    Current Medications:  Outpatient Medications Marked as Taking for the 4/16/24 encounter (Office Visit) with Brandon Bar MD   Medication Sig    lisinopril 10 MG Oral Tab Take 1 tablet (10 mg total) by mouth every evening.    atorvastatin 10 MG Oral Tab Take 1 tablet (10 mg total) by mouth every evening.    metFORMIN  MG Oral Tablet 24 Hr Take 1 tablet (500 mg total) by mouth daily with breakfast.    alendronate 70 MG Oral Tab Take 1 tablet (70 mg total) by mouth once a week.    omeprazole 20 MG Oral Capsule Delayed Release Take 1 capsule (20 mg total) by mouth 2 (two) times daily before meals.    Blood Glucose Monitoring Suppl (ONETOUCH VERIO) w/Device Does not apply Kit 1 each daily. E11.9FS daily       Medical History:  She  has a past medical history of Anxiety (10/2022), Diabetes ([facility]), Esophageal reflux (11/2020),  Essential hypertension (2010), High blood pressure, History of 2019 novel coronavirus disease (COVID-19), HTN (hypertension), Hypercholesteremia, Hyperlipidemia (2010), Prediabetes, and Visual impairment.  Surgical History:  She  has a past surgical history that includes appendectomy; Cataract extraction; appendectomy; colonoscopy (N/A, 04/29/2021); colonoscopy (N/A, 09/20/2022); and cataract (2015).   Family History:  Her family history includes Diabetes in her brother and sister; Hypertension in her brother and sister.  Social History:  She  reports that she has quit smoking. She has never used smokeless tobacco. She reports that she does not drink alcohol and does not use drugs.    Tobacco:  She smoked tobacco in the past but quit greater than 12 months ago.  Social History     Tobacco Use   Smoking Status Former   Smokeless Tobacco Never   Tobacco Comments    Quit 15 years ago          CAGE Alcohol Screen:   Cage screening not needed because reported NO to Alcohol use on social history.      Patient Care Team:  Brandon Bar MD as PCP - General (Family Medicine)    Review of Systems     Negative except above    Objective:   Physical Exam  General Appearance:  Alert, cooperative, no distress, appears stated age   Head:  Neck:  Normocephalic, without obvious abnormality, atraumatic  Subtle swelling in lower right and left aspect of neck   Eyes:  PERRL   Ears:  Normal TM's and external ear canals, both ears   Lungs:   Clear to auscultation bilaterally, respirations unlabored   Heart:  Regular rate and rhythm   Extremities: Extremities normal, no cyanosis or edema   Skin: promient veins in anterior chest wall (see picture)   Neurologic: No appreciable abnormality     Bilateral barefoot skin diabetic exam is normal, visualized feet and the appearance is normal.  Bilateral monofilament/sensation of both feet is normal.  Pulsation pedal pulse exam of both lower legs/feet is normal as well.        /60 (BP  Location: Left arm, Patient Position: Sitting, Cuff Size: adult)   Pulse 59   Temp 97.6 °F (36.4 °C) (Temporal)   Resp 16   Ht 4' 9.5\" (1.461 m)   Wt 115 lb 12.8 oz (52.5 kg)   SpO2 97%   BMI 24.62 kg/m²  Estimated body mass index is 24.62 kg/m² as calculated from the following:    Height as of this encounter: 4' 9.5\" (1.461 m).    Weight as of this encounter: 115 lb 12.8 oz (52.5 kg).    Medicare Hearing Assessment:   Hearing Screening    Screening Method: Finger Rub  Finger Rub Result: Pass           Assessment & Plan:   Sherry Negrete is a 78 year old female who presents for a Medicare Assessment.     Encounter for annual health examination  -reviewed age appropriate screening  -due for DEXA in 6 months  -colonoscopy up to date  -up to date on PNA vaccine  -will think about zoster    Type 2 diabetes mellitus without complication, without long-term current use of insulin (HCC)  Type 2 diabetes mellitus with stage 3a chronic kidney disease, without long-term current use of insulin (HCC)  -normal foot exam  -up to date on eye exam  -A1c ~6 today    Essential hypertension  -at goal    Hypercholesterolemia  -check lipids    Protein-calorie malnutrition, unspecified severity (HCC)  -diet better, will continue to monitor    Chronic kidney disease, stage 3a (HCC)  -stable on last labs - will continue to monitor    Perennial allergic rhinitis with seasonal variation  Stable, will continue to monitor    Osteoporosis, unspecified osteoporosis type, unspecified pathological fracture presence  -stable on alendronate  -check dexa in Nov '24    Gastroesophageal reflux disease without esophagitis  -better   -c/w ppi prn and diet changes, f/u prn    Pulmonary fibrosis (HCC)  -breathing remains stable  -denies SOB with exertion  -will continue to monitor    Venous congestion  Vascular abnormality  -continues to have vascular congestion in chest  -etiology unclear  -will refer to vascular surgery for further eval   -CT  chest, and US of that area have been unremarkable      The patient indicates understanding of these issues and agrees to the plan.  Reinforced healthy diet, lifestyle, and exercise.      No follow-ups on file.     VI GARCIA MD, 8/23/2022     Supplementary Documentation:   General Health:  In the past six months, have you lost more than 10 pounds without trying?: 2 - No  Has your appetite been poor?: No  Type of Diet: Low Salt;Diabetic  How does the patient maintain a good energy level?: Daily Walks  How would you describe your daily physical activity?: Light  How would you describe your current health state?: Good  How do you maintain positive mental well-being?: Visiting Family  On a scale of 0 to 10, with 0 being no pain and 10 being severe pain, what is your pain level?: 0 - (None)  In the past six months, have you experienced urine leakage?: 0-No  At any time do you feel concerned for the safety/well-being of yourself and/or your children, in your home or elsewhere?: No  Have you had any immunizations at another office such as Influenza, Hepatitis B, Tetanus, or Pneumococcal?: Yes       Sherry Negrete's SCREENING SCHEDULE   Tests on this list are recommended by your physician but may not be covered, or covered at this frequency, by your insurer.   Please check with your insurance carrier before scheduling to verify coverage.   PREVENTATIVE SERVICES FREQUENCY &  COVERAGE DETAILS LAST COMPLETION DATE   Diabetes Screening    Fasting Blood Sugar /  Glucose    One screening every 12 months if never tested or if previously tested but not diagnosed with pre-diabetes   One screening every 6 months if diagnosed with pre-diabetes Lab Results   Component Value Date     (H) 11/15/2023        Cardiovascular Disease Screening    Lipid Panel  Cholesterol  Lipoprotein (HDL)  Triglycerides Covered every 5 years for all Medicare beneficiaries without apparent signs or symptoms of cardiovascular disease Lab Results    Component Value Date    CHOLEST 179 11/15/2023    HDL 56 11/15/2023    LDL 86 11/15/2023    TRIG 220 (H) 11/15/2023         Electrocardiogram (EKG)   Covered if needed at Welcome to Medicare, and non-screening if indicated for medical reasons 09/04/2020      Ultrasound Screening for Abdominal Aortic Aneurysm (AAA) Covered once in a lifetime for one of the following risk factors    Men who are 65-75 years old and have ever smoked    Anyone with a family history -     Colorectal Cancer Screening  Covered for ages 50-85; only need ONE of the following:    Colonoscopy   Covered every 10 years    Covered every 2 years if patient is at high risk or previous colonoscopy was abnormal 09/20/2022    Health Maintenance   Topic Date Due    Colorectal Cancer Screening  09/20/2025       Flexible Sigmoidoscopy   Covered every 4 years -    Fecal Occult Blood Test Covered annually -   Bone Density Screening    Bone density screening    Covered every 2 years after age 65 if diagnosed with risk of osteoporosis or estrogen deficiency.    Covered yearly for long-term glucocorticoid medication use (Steroids) Last Dexa Scan:    XR DEXA BONE DENSITOMETRY (CPT=77080) 02/04/2021      No recommendations at this time   Pap and Pelvic    Pap   Covered every 2 years for women at normal risk; Annually if at high risk -  No recommendations at this time    Chlamydia Annually if high risk -  No recommendations at this time   Screening Mammogram    Mammogram     Recommend annually for all female patients aged 40 and older    One baseline mammogram covered for patients aged 35-39 -    Health Maintenance   Topic Date Due    Mammogram  Discontinued       Immunizations    Influenza Covered once per flu season  Please get every year -  No recommendations at this time    Pneumococcal Each vaccine ([id number] & [id number]) covered once after 65 Prevnar 13: 12/30/2019    [id number]: -     No recommendations at this time    Hepatitis B One screening  covered for patients with certain risk factors   -  No recommendations at this time    Tetanus Toxoid Not covered by Medicare Part B unless medically necessary (cut with metal); may be covered with your pharmacy prescription benefits -    Tetanus, Diptheria and Pertusis TD and TDaP Not covered by Medicare Part B -  No recommendations at this time    Zoster Not covered by Medicare Part B; may be covered with your pharmacy  prescription benefits -  Zoster Vaccines(1 of 2) Never done     Diabetes      Hemoglobin A1C Annually; if last result is elevated, may be asked to retest more frequently.    Medicare covers every 3 months Lab Results   Component Value Date     (H) 08/26/2022    A1C 6.6 (A) 04/16/2024       No recommendations at this time    Creat/alb ratio Annually Lab Results   Component Value Date    MICROALBCREA  05/02/2023      Comment:      Unable to calculate due to Urine Microalbumin <0.5 mg/dL         LDL Annually Lab Results   Component Value Date    LDL 86 11/15/2023       Dilated Eye Exam Annually Last Diabetic Eye Exam:  Last Dilated Eye Exam: 07/21/23  Eye Exam shows Diabetic Retinopathy?: No       Annual Monitoring of Persistent Medications (ACE/ARB, digoxin diuretics, anticonvulsants)    Potassium Annually Lab Results   Component Value Date    K 4.0 11/15/2023         Creatinine   Annually Lab Results   Component Value Date    CREATSERUM 1.00 11/15/2023         BUN Annually Lab Results   Component Value Date    BUN 19 11/15/2023       Drug Serum Conc Annually No results found for: \"DIGOXIN\", \"DIG\", \"VALP\"                Overall 60 minutes was spent on this encounter, 40 mins spent reviewing and providing care coordination for these conditions: htn, lipid, osteoporosis, vascular congestion  20  minutes was spent reviewing wellness elements and providing age appropriate screenings.

## 2024-04-16 NOTE — PATIENT INSTRUCTIONS
Continua todo colt medicinas    Llama para hacer juan eva con especialista de las venas    Regresa en 6 meses, antes si necessita

## 2024-10-16 ENCOUNTER — OFFICE VISIT (OUTPATIENT)
Dept: FAMILY MEDICINE CLINIC | Facility: CLINIC | Age: 79
End: 2024-10-16
Payer: MEDICARE

## 2024-10-16 ENCOUNTER — LAB ENCOUNTER (OUTPATIENT)
Dept: LAB | Age: 79
End: 2024-10-16
Attending: FAMILY MEDICINE
Payer: MEDICARE

## 2024-10-16 VITALS
RESPIRATION RATE: 16 BRPM | OXYGEN SATURATION: 100 % | DIASTOLIC BLOOD PRESSURE: 66 MMHG | WEIGHT: 113.81 LBS | HEIGHT: 57.5 IN | TEMPERATURE: 98 F | BODY MASS INDEX: 24.22 KG/M2 | HEART RATE: 62 BPM | SYSTOLIC BLOOD PRESSURE: 128 MMHG

## 2024-10-16 DIAGNOSIS — M81.0 OSTEOPOROSIS, UNSPECIFIED OSTEOPOROSIS TYPE, UNSPECIFIED PATHOLOGICAL FRACTURE PRESENCE: ICD-10-CM

## 2024-10-16 DIAGNOSIS — Z23 NEED FOR VACCINATION: ICD-10-CM

## 2024-10-16 DIAGNOSIS — Z78.0 POST-MENOPAUSE: ICD-10-CM

## 2024-10-16 DIAGNOSIS — E78.00 HYPERCHOLESTEROLEMIA: ICD-10-CM

## 2024-10-16 DIAGNOSIS — I10 ESSENTIAL HYPERTENSION: ICD-10-CM

## 2024-10-16 DIAGNOSIS — E11.9 TYPE 2 DIABETES MELLITUS WITHOUT COMPLICATION, WITHOUT LONG-TERM CURRENT USE OF INSULIN (HCC): Primary | ICD-10-CM

## 2024-10-16 DIAGNOSIS — E11.9 TYPE 2 DIABETES MELLITUS WITHOUT COMPLICATION, WITHOUT LONG-TERM CURRENT USE OF INSULIN (HCC): ICD-10-CM

## 2024-10-16 LAB
ALBUMIN SERPL-MCNC: 4.4 G/DL (ref 3.2–4.8)
ALBUMIN/GLOB SERPL: 1.6 {RATIO} (ref 1–2)
ALP LIVER SERPL-CCNC: 98 U/L
ALT SERPL-CCNC: 13 U/L
ANION GAP SERPL CALC-SCNC: 3 MMOL/L (ref 0–18)
AST SERPL-CCNC: 20 U/L (ref ?–34)
BASOPHILS # BLD AUTO: 0.07 X10(3) UL (ref 0–0.2)
BASOPHILS NFR BLD AUTO: 1.1 %
BILIRUB SERPL-MCNC: 0.7 MG/DL (ref 0.2–1.1)
BUN BLD-MCNC: 13 MG/DL (ref 9–23)
CALCIUM BLD-MCNC: 9.8 MG/DL (ref 8.7–10.4)
CHLORIDE SERPL-SCNC: 108 MMOL/L (ref 98–112)
CHOLEST SERPL-MCNC: 191 MG/DL (ref ?–200)
CO2 SERPL-SCNC: 27 MMOL/L (ref 21–32)
CREAT BLD-MCNC: 0.83 MG/DL
CREAT UR-SCNC: 41.5 MG/DL
EGFRCR SERPLBLD CKD-EPI 2021: 72 ML/MIN/1.73M2 (ref 60–?)
EOSINOPHIL # BLD AUTO: 0.17 X10(3) UL (ref 0–0.7)
EOSINOPHIL NFR BLD AUTO: 2.8 %
ERYTHROCYTE [DISTWIDTH] IN BLOOD BY AUTOMATED COUNT: 14.6 %
FASTING PATIENT LIPID ANSWER: YES
FASTING STATUS PATIENT QL REPORTED: YES
GLOBULIN PLAS-MCNC: 2.7 G/DL (ref 2–3.5)
GLUCOSE BLD-MCNC: 109 MG/DL (ref 70–99)
HCT VFR BLD AUTO: 42.4 %
HDLC SERPL-MCNC: 52 MG/DL (ref 40–59)
HEMOGLOBIN A1C: 6.6 % (ref 4.3–5.6)
HGB BLD-MCNC: 13.5 G/DL
IMM GRANULOCYTES # BLD AUTO: 0.01 X10(3) UL (ref 0–1)
IMM GRANULOCYTES NFR BLD: 0.2 %
LDLC SERPL CALC-MCNC: 97 MG/DL (ref ?–100)
LYMPHOCYTES # BLD AUTO: 1.91 X10(3) UL (ref 1–4)
LYMPHOCYTES NFR BLD AUTO: 31.4 %
MCH RBC QN AUTO: 29.8 PG (ref 26–34)
MCHC RBC AUTO-ENTMCNC: 31.8 G/DL (ref 31–37)
MCV RBC AUTO: 93.6 FL
MICROALBUMIN UR-MCNC: <0.3 MG/DL
MONOCYTES # BLD AUTO: 0.38 X10(3) UL (ref 0.1–1)
MONOCYTES NFR BLD AUTO: 6.2 %
NEUTROPHILS # BLD AUTO: 3.55 X10 (3) UL (ref 1.5–7.7)
NEUTROPHILS # BLD AUTO: 3.55 X10(3) UL (ref 1.5–7.7)
NEUTROPHILS NFR BLD AUTO: 58.3 %
NONHDLC SERPL-MCNC: 139 MG/DL (ref ?–130)
OSMOLALITY SERPL CALC.SUM OF ELEC: 287 MOSM/KG (ref 275–295)
PLATELET # BLD AUTO: 166 10(3)UL (ref 150–450)
POTASSIUM SERPL-SCNC: 4.5 MMOL/L (ref 3.5–5.1)
PROT SERPL-MCNC: 7.1 G/DL (ref 5.7–8.2)
RBC # BLD AUTO: 4.53 X10(6)UL
SODIUM SERPL-SCNC: 138 MMOL/L (ref 136–145)
TRIGL SERPL-MCNC: 250 MG/DL (ref 30–149)
TSI SER-ACNC: 1.63 MIU/ML (ref 0.55–4.78)
VLDLC SERPL CALC-MCNC: 42 MG/DL (ref 0–30)
WBC # BLD AUTO: 6.1 X10(3) UL (ref 4–11)

## 2024-10-16 PROCEDURE — 80053 COMPREHEN METABOLIC PANEL: CPT

## 2024-10-16 PROCEDURE — 90662 IIV NO PRSV INCREASED AG IM: CPT | Performed by: FAMILY MEDICINE

## 2024-10-16 PROCEDURE — 1160F RVW MEDS BY RX/DR IN RCRD: CPT | Performed by: FAMILY MEDICINE

## 2024-10-16 PROCEDURE — 80061 LIPID PANEL: CPT

## 2024-10-16 PROCEDURE — 3008F BODY MASS INDEX DOCD: CPT | Performed by: FAMILY MEDICINE

## 2024-10-16 PROCEDURE — 82570 ASSAY OF URINE CREATININE: CPT

## 2024-10-16 PROCEDURE — 1159F MED LIST DOCD IN RCRD: CPT | Performed by: FAMILY MEDICINE

## 2024-10-16 PROCEDURE — 85025 COMPLETE CBC W/AUTO DIFF WBC: CPT

## 2024-10-16 PROCEDURE — 99215 OFFICE O/P EST HI 40 MIN: CPT | Performed by: FAMILY MEDICINE

## 2024-10-16 PROCEDURE — 3074F SYST BP LT 130 MM HG: CPT | Performed by: FAMILY MEDICINE

## 2024-10-16 PROCEDURE — 84443 ASSAY THYROID STIM HORMONE: CPT

## 2024-10-16 PROCEDURE — 3078F DIAST BP <80 MM HG: CPT | Performed by: FAMILY MEDICINE

## 2024-10-16 PROCEDURE — 82043 UR ALBUMIN QUANTITATIVE: CPT

## 2024-10-16 PROCEDURE — 83036 HEMOGLOBIN GLYCOSYLATED A1C: CPT | Performed by: FAMILY MEDICINE

## 2024-10-16 PROCEDURE — 36415 COLL VENOUS BLD VENIPUNCTURE: CPT

## 2024-10-16 PROCEDURE — G0008 ADMIN INFLUENZA VIRUS VAC: HCPCS | Performed by: FAMILY MEDICINE

## 2024-10-16 RX ORDER — BLOOD-GLUCOSE METER
1 EACH MISCELLANEOUS DAILY
Qty: 1 KIT | Refills: 0 | Status: CANCELLED | OUTPATIENT
Start: 2024-10-16

## 2024-10-16 RX ORDER — METFORMIN HYDROCHLORIDE 500 MG/1
500 TABLET, EXTENDED RELEASE ORAL
Qty: 90 TABLET | Refills: 1 | Status: SHIPPED | OUTPATIENT
Start: 2024-10-16

## 2024-10-16 RX ORDER — ALENDRONATE SODIUM 70 MG/1
70 TABLET ORAL WEEKLY
Qty: 12 TABLET | Refills: 1 | Status: SHIPPED | OUTPATIENT
Start: 2024-10-16

## 2024-10-16 RX ORDER — ATORVASTATIN CALCIUM 10 MG/1
10 TABLET, FILM COATED ORAL EVERY EVENING
Qty: 90 TABLET | Refills: 1 | Status: SHIPPED | OUTPATIENT
Start: 2024-10-16

## 2024-10-16 RX ORDER — BLOOD SUGAR DIAGNOSTIC
STRIP MISCELLANEOUS
Qty: 100 STRIP | Refills: 3 | Status: SHIPPED | OUTPATIENT
Start: 2024-10-16 | End: 2025-10-16

## 2024-10-16 RX ORDER — LISINOPRIL 10 MG/1
10 TABLET ORAL EVERY EVENING
Qty: 90 TABLET | Refills: 1 | Status: SHIPPED | OUTPATIENT
Start: 2024-10-16

## 2024-10-16 NOTE — PROGRESS NOTES
Sherry Negrete is a 79 year old female here for   Chief Complaint   Patient presents with    Diabetes       HPI:       1. Type 2 diabetes mellitus without complication, without long-term current use of insulin (HCC)  --Last A1c value was 6.6% done 10/16/2024.    -last eye exam: Last Diabetic Eye Exam:  Last Dilated Eye Exam: 07/21/23  Eye Exam shows Diabetic Retinopathy?: No  -states has had eye exam 2-3 months ago  -denies hyper or hypo glycemic symptoms     2. Essential hypertension  -at goal  -needs refills    3. Hypercholesterolemia  -tolerating statin  -due for labs    4. Post-menopause  5. Osteoporosis, unspecified osteoporosis type, unspecified pathological fracture presence  -tolerating med  -due for repeat dexa    6. Need for vaccination  -due for:   - High Dose Fluzone trivalent influenza, 65 yrs+ PFS [93897]        HISTORY:  Past Medical History:    Anxiety    Next episode in february    Diabetes (HCC)    Esophageal reflux    Essential hypertension    High blood pressure    History of 2019 novel coronavirus disease (COVID-19)    tested approx 11-4/2021  [facility]. Symptoms cough and SOB. Not Hospitalized. No residual problems.    HTN (hypertension)    Hypercholesteremia    Hyperlipidemia    Prediabetes    Visual impairment      Past Surgical History:   Procedure Laterality Date    Appendectomy      Appendectomy      Cataract  2015    Cataract extraction      BL    Colonoscopy N/A 04/29/2021    Procedure: COLONOSCOPY with forcep polypectomy and cold snare polypectomy;  Surgeon: Italo Larios MD;  Location:  ENDOSCOPY    Colonoscopy N/A 09/20/2022    Procedure: COLONOSCOPY WITH FORCEP AND COLD SNARE POLYPECTOMIES;  Surgeon: Italo Larios MD;  Location:  ENDOSCOPY      Family History   Problem Relation Age of Onset    Diabetes Sister     Hypertension Sister     Diabetes Brother     Hypertension Brother       Social History:   Social History     Socioeconomic History    Marital status:     Tobacco Use    Smoking status: Former    Smokeless tobacco: Never    Tobacco comments:     Quit 15 years ago   Vaping Use    Vaping status: Never Used   Substance and Sexual Activity    Alcohol use: Never    Drug use: Never    Sexual activity: Not Currently     Partners: Male   Other Topics Concern    Caffeine Concern Yes    Stress Concern No    Weight Concern No    Special Diet No    Exercise Yes    Seat Belt Yes     Social Drivers of Health      Received from [facility]        Medications (Active prior to today's visit):  Current Outpatient Medications   Medication Sig Dispense Refill    omeprazole 20 MG Oral Capsule Delayed Release Take 1 capsule (20 mg total) by mouth 2 (two) times daily before meals. 180 capsule 1    metFORMIN  MG Oral Tablet 24 Hr Take 1 tablet (500 mg total) by mouth daily with breakfast. 90 tablet 1    lisinopril 10 MG Oral Tab Take 1 tablet (10 mg total) by mouth every evening. 90 tablet 1    atorvastatin 10 MG Oral Tab Take 1 tablet (10 mg total) by mouth every evening. 90 tablet 1    alendronate 70 MG Oral Tab Take 1 tablet (70 mg total) by mouth once a week. 12 tablet 1    Glucose Blood (ONETOUCH VERIO) In Vitro Strip E11.9Daily  strip 3    Blood Glucose Monitoring Suppl (ONETOUCH VERIO) w/Device Does not apply Kit 1 each daily. E11.9FS daily 1 kit 0       Allergies:  Allergies[1]      ROS:   See HPI for relevant ROS    --GEN: No other complaints  --HEENT: No other complaints  --RESP: No other complaints  --CV: No other complaints  --GI: No other complaints  --MSK: No other complaints    All other systems reviewed are negative    PHYSICAL EXAM:   /66 (BP Location: Left arm, Patient Position: Sitting)   Pulse 62   Temp 97.6 °F (36.4 °C) (Temporal)   Resp 16   Ht 4' 9.5\" (1.461 m)   Wt 113 lb 12.8 oz (51.6 kg)   SpO2 100%   BMI 24.20 kg/m²     Gen: NAD  HEENT: NCAT, pupils equal and round  Pulm: CTAB, no wheezing  CV: RRR  Ext: full  ROM  Psych: normal affect     Bilateral barefoot skin diabetic exam is normal, visualized feet and the appearance is normal.  Bilateral monofilament/sensation of both feet is normal.  Pulsation pedal pulse exam of both lower legs/feet is normal as well.      ASSESSMENT/PLAN:     1. Type 2 diabetes mellitus without complication, without long-term current use of insulin (HCC)  -at goal  -c/w meds  -foot exam normal  -will request records for eye exam  -f/u in 6 months    - metFORMIN  MG Oral Tablet 24 Hr; Take 1 tablet (500 mg total) by mouth daily with breakfast.  Dispense: 90 tablet; Refill: 1  - lisinopril 10 MG Oral Tab; Take 1 tablet (10 mg total) by mouth every evening.  Dispense: 90 tablet; Refill: 1  - atorvastatin 10 MG Oral Tab; Take 1 tablet (10 mg total) by mouth every evening.  Dispense: 90 tablet; Refill: 1  - Glucose Blood (ONETOUCH VERIO) In Vitro Strip; E11.9Daily FS  Dispense: 100 strip; Refill: 3  - CBC With Differential With Platelet; Future  - Comp Metabolic Panel (14); Future  - Lipid Panel; Future  - TSH W Reflex To Free T4; Future  - Microalb/Creat Ratio, Random Urine; Future  - POC Hemoglobin A1C    2. Essential hypertension  -at goal  -c/w med  -check labs    - lisinopril 10 MG Oral Tab; Take 1 tablet (10 mg total) by mouth every evening.  Dispense: 90 tablet; Refill: 1  - CBC With Differential With Platelet; Future  - Comp Metabolic Panel (14); Future  - Lipid Panel; Future  - TSH W Reflex To Free T4; Future    3. Hypercholesterolemia  -c/w statin  -check labs    - atorvastatin 10 MG Oral Tab; Take 1 tablet (10 mg total) by mouth every evening.  Dispense: 90 tablet; Refill: 1  - CBC With Differential With Platelet; Future  - Comp Metabolic Panel (14); Future  - Lipid Panel; Future  - TSH W Reflex To Free T4; Future    4. Post-menopause  5. Osteoporosis, unspecified osteoporosis type, unspecified pathological fracture presence  -refilled med  -check bone density    - alendronate 70 MG  Oral Tab; Take 1 tablet (70 mg total) by mouth once a week.  Dispense: 12 tablet; Refill: 1  - XR DEXA BONE DENSITOMETRY (CPT=77080); Future    6. Need for vaccination  - High Dose Fluzone trivalent influenza, 65 yrs+ PFS [66459]        Chronic Conditions:    No problem-specific Assessment & Plan notes found for this encounter.       Health Maintenance:  Health Maintenance   Topic Date Due    Zoster Vaccines (1 of 2) Never done    Diabetes Care Foot Exam  05/02/2024    Diabetes Care Dilated Eye Exam  07/21/2024    COVID-19 Vaccine (6 - 2023-24 season) 09/01/2024    Diabetes Care A1C  10/16/2024    Diabetes Care: GFR  11/15/2024    Diabetes Care: Microalb/Creat Ratio  04/16/2025    Colorectal Cancer Screening  09/20/2025    Influenza Vaccine  Completed    DEXA Scan  Completed    MA Annual Health Assessment  Completed    Annual Depression Screening  Completed    Fall Risk Screening (Annual)  Completed    Pneumococcal Vaccine: 65+ Years  Completed    Mammogram  Discontinued               The patient is asked to return in 6 months.    Orders This Visit:  Orders Placed This Encounter   Procedures    CBC With Differential With Platelet    Comp Metabolic Panel (14)    Lipid Panel    TSH W Reflex To Free T4    Microalb/Creat Ratio, Random Urine    POC Hemoglobin A1C    High Dose Fluzone trivalent influenza, 65 yrs+ PFS [89681]       Meds This Visit:  Requested Prescriptions     Signed Prescriptions Disp Refills    omeprazole 20 MG Oral Capsule Delayed Release 180 capsule 1     Sig: Take 1 capsule (20 mg total) by mouth 2 (two) times daily before meals.    metFORMIN  MG Oral Tablet 24 Hr 90 tablet 1     Sig: Take 1 tablet (500 mg total) by mouth daily with breakfast.    lisinopril 10 MG Oral Tab 90 tablet 1     Sig: Take 1 tablet (10 mg total) by mouth every evening.    atorvastatin 10 MG Oral Tab 90 tablet 1     Sig: Take 1 tablet (10 mg total) by mouth every evening.    alendronate 70 MG Oral Tab 12 tablet 1     Sig:  Take 1 tablet (70 mg total) by mouth once a week.    Glucose Blood (ONETOUCH VERIO) In Vitro Strip 100 strip 3     Sig: E11.9Daily FS       Imaging & Referrals:  INFLUENZA VAC HIGH DOSE PRSV FREE  XR DEXA BONE DENSITOMETRY (CPT=77080)     VI GARCIA MD    I spent a total of 40 minutes, more than half of which was spent counseling/coordinating care regarding dm, htn, lipid, osteoporosis         [1]   Allergies  Allergen Reactions    Adhesive Tape OTHER (SEE COMMENTS)     EKG lead removed patients skin (only on for 2 hours, patients skin held when removed but still tore skin)

## 2024-10-16 NOTE — PATIENT INSTRUCTIONS
Continua todo colt medicamentos    Llama para eva de huesos    Va a lab para chavo en ayunas    Regresa en 6 meses

## 2024-10-22 ENCOUNTER — HOSPITAL ENCOUNTER (OUTPATIENT)
Dept: BONE DENSITY | Age: 79
Discharge: HOME | End: 2024-10-22
Attending: FAMILY MEDICINE
Payer: MEDICARE

## 2024-10-22 DIAGNOSIS — M81.0 OSTEOPOROSIS, UNSPECIFIED OSTEOPOROSIS TYPE, UNSPECIFIED PATHOLOGICAL FRACTURE PRESENCE: ICD-10-CM

## 2024-10-22 PROCEDURE — 77080 DXA BONE DENSITY AXIAL: CPT | Performed by: FAMILY MEDICINE

## 2024-11-04 DIAGNOSIS — M81.0 AGE-RELATED OSTEOPOROSIS WITHOUT CURRENT PATHOLOGICAL FRACTURE: Primary | ICD-10-CM

## 2025-03-09 NOTE — PROGRESS NOTES
Rheumatology New Patient Note  =====================================================================================================    Date of visit: 3/10/2025  ?  Chief complaint: osteoporosis  Chief Complaint   Patient presents with    New Patient     Patient referred by her primary for osteoporosis. Patient did have a fall back in December but no fractures that she is aware of. Last DEXA was 10/22/2024.  Rapid 3 score is 0.     ?  Referring (will send letter)  PCP  VI GARCIA MD  Fax: [phone]  Phone: [phone]    =====================================================================================================  HPI    Sherry Negrete is a 79 year old female     History of Present Illness  The patient, with osteoporosis, presents with osteoporosis management. She is accompanied by a family member who she requests to help with translation.  Declines  through facility.     She has been managing osteoporosis  since diagnosis in 2021. She takes alendronate once a week on an empty stomach with a full glass of water and has not missed any doses. She consumes a cup of milk daily and takes Caltrate chewable tablets, two per day, providing 600 mg of calcium each. She drinks one cup of coffee daily and engages in regular physical activity, primarily walking. She experienced menopause in her early forties. No history of broken bones, height loss.     She has no known kidney issues or history of cancer. She has a history of smoking, having quit nearly twenty years ago after smoking two cigarettes a day. She does not consume alcohol and has no history of steroid use, psoriatic arthritis, or rheumatoid arthritis. She has allergies to adhesives, which cause skin outbreaks.    Osteoporosis ROS:  Prior fracture: no  Prior fracture in parents: no  Smoking history: prior smoking (2 cigarettes), quit in the early 2000s.   History of glucocorticoids: no  Inflammatory arthritis: no  Secondary osteoporosis:  no  3 or more alcoholic drinks per day: no  Be treated for osteoporosis: yes since 2021 (alendronate)  History of colon anorexia, asthma, ESRD, hyperparathyroidism, seizures, cancer, IBD, hysterectomy. : no  Do perform weightbearing exercises: walking  To consume dairy products: yes  Do consume caffeinated beverages: one cup per day  When did menopause begin: early 40s    14 point ROS negative except noted above    Medications:   omeprazole 20 MG Oral Capsule Delayed Release Take 1 capsule (20 mg total) by mouth 2 (two) times daily before meals. 180 capsule 1    metFORMIN  MG Oral Tablet 24 Hr Take 1 tablet (500 mg total) by mouth daily with breakfast. 90 tablet 1    lisinopril 10 MG Oral Tab Take 1 tablet (10 mg total) by mouth every evening. 90 tablet 1    atorvastatin 10 MG Oral Tab Take 1 tablet (10 mg total) by mouth every evening. 90 tablet 1    alendronate 70 MG Oral Tab Take 1 tablet (70 mg total) by mouth once a week. 12 tablet 1       Past Medical History:  Past Medical History:    Anxiety    Next episode in february    Diabetes (HCC)    Esophageal reflux    Essential hypertension    High blood pressure    History of 2019 novel coronavirus disease (COVID-19)    tested approx 11-4/2021  [facility]. Symptoms cough and SOB. Not Hospitalized. No residual problems.    HTN (hypertension)    Hypercholesteremia    Hyperlipidemia    Prediabetes    Visual impairment     Past Surgical History:  Past Surgical History:   Procedure Laterality Date    Appendectomy      Appendectomy      Cataract  2015    Cataract extraction      BL    Colonoscopy N/A 04/29/2021    Procedure: COLONOSCOPY with forcep polypectomy and cold snare polypectomy;  Surgeon: Italo Larios MD;  Location:  ENDOSCOPY    Colonoscopy N/A 09/20/2022    Procedure: COLONOSCOPY WITH FORCEP AND COLD SNARE POLYPECTOMIES;  Surgeon: Italo Larios MD;  Location:  ENDOSCOPY     Family History:  Family History   Problem Relation Age of  Onset    Diabetes Sister     Hypertension Sister     Diabetes Brother     Hypertension Brother      Social History:  Social History     Socioeconomic History    Marital status:    Tobacco Use    Smoking status: Former    Smokeless tobacco: Never    Tobacco comments:     Quit 15 years ago   Vaping Use    Vaping status: Never Used   Substance and Sexual Activity    Alcohol use: Never    Drug use: Never    Sexual activity: Not Currently     Partners: Male   Other Topics Concern    Caffeine Concern Yes    Stress Concern No    Weight Concern No    Special Diet No    Exercise Yes    Seat Belt Yes     Social Drivers of Health      Received from [facility]     ?  Allergies:  Allergies[1]      Objective    Vitals:    03/10/25 0943 03/10/25 0950   BP: 150/64 136/58   Pulse: 70    Resp: 16    Temp: 96.9 °F (36.1 °C)    SpO2: 98%    Weight: 112 lb (50.8 kg)    Height: 4' 9.5\" (1.461 m)        GEN: NAD, well-nourished.   HEENT: Head: NCAT. Face: No lesions. Eyes: Conjunctiva clear.   PULM:  easy effort  Extremities: No cyanosis, edema or deformities.   Neurologic: Strength, CN2-12 grossly intact   Skin: No lesions or rashes.  MSK: 28 joint count performed. No evidence of synovitis in mcp, pip, dip, wrist, elbows, shoulders, hips, knees, ankles, mtp unless otherwise noted. Full ROM of elbows, wrists, knees.     No inflammatory arthritis or synovitis noted      ?  Labs:  Lab Results   Component Value Date    WBC 6.1 10/16/2024    RBC 4.53 10/16/2024    HGB 13.5 10/16/2024    HCT 42.4 10/16/2024    .0 10/16/2024    MCV 93.6 10/16/2024    MCH 29.8 10/16/2024    MCHC 31.8 10/16/2024    RDW 14.6 10/16/2024    NEPRELIM 3.55 10/16/2024    NEPERCENT 58.3 10/16/2024    LYPERCENT 31.4 10/16/2024    MOPERCENT 6.2 10/16/2024    EOPERCENT 2.8 10/16/2024    BAPERCENT 1.1 10/16/2024    NE 3.55 10/16/2024    LYMABS 1.91 10/16/2024    MOABSO 0.38 10/16/2024    EOABSO 0.17 10/16/2024    BAABSO 0.07 10/16/2024     Lab  Results   Component Value Date     (H) 10/16/2024    BUN 13 10/16/2024    BUNCREA 20.0 05/25/2021    CREATSERUM 0.83 10/16/2024    ANIONGAP 3 10/16/2024    GFRNAA 70 12/17/2021    GFRAA 81 12/17/2021    CA 9.8 10/16/2024    OSMOCALC 287 10/16/2024    ALKPHO 98 10/16/2024    AST 20 10/16/2024    ALT 13 10/16/2024    BILT 0.7 10/16/2024    TP 7.1 10/16/2024    ALB 4.4 10/16/2024    GLOBULIN 2.7 10/16/2024     10/16/2024    K 4.5 10/16/2024     10/16/2024    CO2 27.0 10/16/2024         No results found for: \"ANATI\", \"JOSE ROBERTO\", \"ANAS\", \"ANASCRN\", \"ANASCRNRFLX\", \"RL\"  No results found for: \"SSA\", \"SSAUR\", \"ANTISSA\", \"SSA52\", \"SSA60\", \"SSADD\", \"SSB\", \"ANTISSB\"  No results found for: \"DSDNA\", \"ANTIDSDNA\", \"SMUD\", \"ANTISM\", \"SM\", \"RNP\", \"ANTIRNP\", \"SMITHRNP\"  No results found for: \"SCL70\", \"SCL\", \"PVQGRSR82\"  No results found for: \"C3\", \"C4\"  No results found for: \"DRVVT\", \"LAINT\", \"PTTLUPUS\", \"LUPUSINTERP\", \"LA\", \"D7MK2DVTIC\", \"D6QG6TITMK\", \"V7VDCXMLIR\", \"D1NHDJAXQA\"  No results found for: \"CARDIOLIPIGG\", \"CARDIOLIPIGM\", \"CARDIOLIPIGA\", \"CARDIOIGA\", \"CARLIP\"      Additional Labs:    Radiology:    10/2024    LUMBAR SPINE ANALYSIS RESULTS:      Bone mineral density (BMD) (g/cm2):  0.656    Lumbar T-Score:  -3.6      % young normals:  63      % age matched controls:  86      Change from prior spine examination:  6.0*%               TOTAL HIP ANALYSIS RESULTS:        Bone mineral density (BMD) (g/cm2):  0.689      Total Hip T-Score:  -2.0      % young normals:  72      % age matched controls:  98      Change from prior hip examination:  -0.1%               FEMORAL NECK ANALYSIS RESULTS:        Bone mineral density (BMD) (g/cm2):  0.503      Femoral neck T-Score:  -3.1      % young normals:  58      % age matched controls:  82      Change from prior hip examination:  1.0%   ==============================================================================================================    2/2021 DEXA  LUMBAR SPINE  ANALYSIS RESULTS:      Bone mineral density (BMD) (g/cm2):  0.619    Lumbar T-Score:  -3.9      % young normals:  59      % age matched controls:  79      Change from prior spine examination:  N/A               TOTAL HIP ANALYSIS RESULTS:        Bone mineral density (BMD) (g/cm2):  0.690      Total Hip T-Score:  -2.0      % young normals:  72      % age matched controls:  95      Change from prior hip examination:  N/A               FEMORAL NECK ANALYSIS RESULTS:        Bone mineral density (BMD) (g/cm2):  0.498      Femoral neck T-Score:  -3.2      % young normals:  57      % age matched controls:  78      Change from prior hip examination:  N/A      Radiology review:      =====================================================================================================  Assessment and Plan  Assessment:  1. Osteoporosis, unspecified osteoporosis type, unspecified pathological fracture presence    2. Therapeutic drug monitoring      # Osteoporosis  -diagnosed in 2021  -no fractures  -Risk factors include early menopause (early 40s), smoking (now quit)  -Has been on alendronate since late 2021 with stable bone mineral density    Plan:  --If not yet completed. Osteoporosis and/or osteopenia work-up: CMP, CBC, vitamin D, PTH, mag, Phos, 24 hr calcium/Cr, SPEP  -Long discussion with the patient today.  Given T-score of -3.6 in the lumbar spine, her osteoporosis is quite severe.  Would recommend transitioning from alendronate to Prolia.  However the patient declines indicating she would rather take pills for her osteoporosis.      More reading about Prolia given    See dentist    Continue Caltrate + 1 glass of milk each day. That is about 1500 mg of calcium.     Rtc 1 year for rediscussion      Diagnoses and all orders for this visit:    Osteoporosis, unspecified osteoporosis type, unspecified pathological fracture presence  -     Calcium, 24Hr Urine; Future  -     Creatinine, 24-Hour Urine; Future  -     PTH, Intact;  Future  -     Phosphorus; Future  -     Magnesium; Future  -     Vitamin D; Future  -     Monoclonal Protein Study; Future  -     Comp Metabolic Panel (14) [E]; Future    Therapeutic drug monitoring  -     Calcium, 24Hr Urine; Future  -     Creatinine, 24-Hour Urine; Future  -     PTH, Intact; Future  -     Phosphorus; Future  -     Magnesium; Future  -     Vitamin D; Future  -     Monoclonal Protein Study; Future  -     Comp Metabolic Panel (14) [E]; Future        Return in about 14 months (around 5/14/2026).      The above plan of care, diagnosis, orders, and follow-up were discussed with the patient. Questions related to this recommended plan of care were answered.    Thank you for referring this delightful patient to me. Please feel free to contact me with any questions.     This report was performed utilizing speech recognition software technology. Despite proofreading, speech recognition errors could escape detection. If a word or phrase is confusing or out of context, please do not hesitate to call for   clarification.       Kind regards      Lawanda Lemus MD  [facility]         [1]   Allergies  Allergen Reactions    Adhesive Tape OTHER (SEE COMMENTS)     EKG lead removed patients skin (only on for 2 hours, patients skin held when removed but still tore skin)

## 2025-03-10 ENCOUNTER — OFFICE VISIT (OUTPATIENT)
Dept: RHEUMATOLOGY | Facility: CLINIC | Age: 80
End: 2025-03-10
Payer: MEDICARE

## 2025-03-10 VITALS
SYSTOLIC BLOOD PRESSURE: 136 MMHG | BODY MASS INDEX: 23.83 KG/M2 | HEART RATE: 70 BPM | TEMPERATURE: 97 F | HEIGHT: 57.5 IN | DIASTOLIC BLOOD PRESSURE: 58 MMHG | OXYGEN SATURATION: 98 % | RESPIRATION RATE: 16 BRPM | WEIGHT: 112 LBS

## 2025-03-10 DIAGNOSIS — Z51.81 THERAPEUTIC DRUG MONITORING: ICD-10-CM

## 2025-03-10 DIAGNOSIS — M81.0 OSTEOPOROSIS, UNSPECIFIED OSTEOPOROSIS TYPE, UNSPECIFIED PATHOLOGICAL FRACTURE PRESENCE: Primary | ICD-10-CM

## 2025-03-10 PROCEDURE — 1160F RVW MEDS BY RX/DR IN RCRD: CPT | Performed by: INTERNAL MEDICINE

## 2025-03-10 PROCEDURE — 3008F BODY MASS INDEX DOCD: CPT | Performed by: INTERNAL MEDICINE

## 2025-03-10 PROCEDURE — 3078F DIAST BP <80 MM HG: CPT | Performed by: INTERNAL MEDICINE

## 2025-03-10 PROCEDURE — 99204 OFFICE O/P NEW MOD 45 MIN: CPT | Performed by: INTERNAL MEDICINE

## 2025-03-10 PROCEDURE — 3075F SYST BP GE 130 - 139MM HG: CPT | Performed by: INTERNAL MEDICINE

## 2025-03-10 PROCEDURE — 1159F MED LIST DOCD IN RCRD: CPT | Performed by: INTERNAL MEDICINE

## 2025-03-10 NOTE — H&P
The following individual(s) verbally consented to be recorded using ambient AI listening technology and understand that they can each withdraw their consent to this listening technology at any point by asking the clinician to turn off or pause the recording:    Patient name: Sherry Negrete  Additional names:

## 2025-03-10 NOTE — PATIENT INSTRUCTIONS
Would recommend Prolia.     See dentist    Continue Caltrate + 1 glass of milk each day. That is about 1500 mg of calcium.     ==============================================================================================================      From:   [email or web address]

## 2025-04-16 ENCOUNTER — OFFICE VISIT (OUTPATIENT)
Dept: FAMILY MEDICINE CLINIC | Facility: CLINIC | Age: 80
End: 2025-04-16
Payer: MEDICARE

## 2025-04-16 ENCOUNTER — TELEPHONE (OUTPATIENT)
Dept: FAMILY MEDICINE CLINIC | Facility: CLINIC | Age: 80
End: 2025-04-16

## 2025-04-16 VITALS
RESPIRATION RATE: 16 BRPM | SYSTOLIC BLOOD PRESSURE: 120 MMHG | TEMPERATURE: 98 F | HEIGHT: 58.25 IN | BODY MASS INDEX: 23.81 KG/M2 | OXYGEN SATURATION: 99 % | WEIGHT: 115 LBS | DIASTOLIC BLOOD PRESSURE: 68 MMHG | HEART RATE: 63 BPM

## 2025-04-16 DIAGNOSIS — I10 ESSENTIAL HYPERTENSION: ICD-10-CM

## 2025-04-16 DIAGNOSIS — Z00.00 ENCOUNTER FOR ANNUAL HEALTH EXAMINATION: Primary | ICD-10-CM

## 2025-04-16 DIAGNOSIS — E11.22 TYPE 2 DIABETES MELLITUS WITH STAGE 3A CHRONIC KIDNEY DISEASE, WITHOUT LONG-TERM CURRENT USE OF INSULIN (HCC): ICD-10-CM

## 2025-04-16 DIAGNOSIS — E11.9 TYPE 2 DIABETES MELLITUS WITHOUT COMPLICATION, WITHOUT LONG-TERM CURRENT USE OF INSULIN (HCC): ICD-10-CM

## 2025-04-16 DIAGNOSIS — E78.00 HYPERCHOLESTEROLEMIA: ICD-10-CM

## 2025-04-16 DIAGNOSIS — K21.9 GASTROESOPHAGEAL REFLUX DISEASE WITHOUT ESOPHAGITIS: ICD-10-CM

## 2025-04-16 DIAGNOSIS — J84.10 PULMONARY FIBROSIS (HCC): ICD-10-CM

## 2025-04-16 DIAGNOSIS — N18.31 TYPE 2 DIABETES MELLITUS WITH STAGE 3A CHRONIC KIDNEY DISEASE, WITHOUT LONG-TERM CURRENT USE OF INSULIN (HCC): ICD-10-CM

## 2025-04-16 DIAGNOSIS — N18.31 CHRONIC KIDNEY DISEASE, STAGE 3A (HCC): ICD-10-CM

## 2025-04-16 DIAGNOSIS — Z78.0 POST-MENOPAUSE: ICD-10-CM

## 2025-04-16 DIAGNOSIS — M81.0 OSTEOPOROSIS, UNSPECIFIED OSTEOPOROSIS TYPE, UNSPECIFIED PATHOLOGICAL FRACTURE PRESENCE: ICD-10-CM

## 2025-04-16 LAB
CREAT UR-SCNC: 27.2 MG/DL
HEMOGLOBIN A1C: 6.7 % (ref 4.3–5.6)
MICROALBUMIN UR-MCNC: <0.3 MG/DL

## 2025-04-16 PROCEDURE — 82043 UR ALBUMIN QUANTITATIVE: CPT | Performed by: FAMILY MEDICINE

## 2025-04-16 PROCEDURE — 82570 ASSAY OF URINE CREATININE: CPT | Performed by: FAMILY MEDICINE

## 2025-04-16 RX ORDER — LISINOPRIL 10 MG/1
10 TABLET ORAL EVERY EVENING
Qty: 90 TABLET | Refills: 1 | Status: SHIPPED | OUTPATIENT
Start: 2025-04-16

## 2025-04-16 RX ORDER — ATORVASTATIN CALCIUM 10 MG/1
10 TABLET, FILM COATED ORAL EVERY EVENING
Qty: 90 TABLET | Refills: 1 | Status: SHIPPED | OUTPATIENT
Start: 2025-04-16

## 2025-04-16 RX ORDER — ALENDRONATE SODIUM 70 MG/1
70 TABLET ORAL WEEKLY
Qty: 12 TABLET | Refills: 1 | Status: SHIPPED | OUTPATIENT
Start: 2025-04-16

## 2025-04-16 RX ORDER — METFORMIN HYDROCHLORIDE 500 MG/1
500 TABLET, EXTENDED RELEASE ORAL
Qty: 90 TABLET | Refills: 1 | Status: SHIPPED | OUTPATIENT
Start: 2025-04-16

## 2025-04-16 RX ORDER — FAMOTIDINE 20 MG/1
20 TABLET, FILM COATED ORAL 2 TIMES DAILY PRN
Qty: 180 TABLET | Refills: 1 | Status: SHIPPED | OUTPATIENT
Start: 2025-04-16

## 2025-04-16 NOTE — PATIENT INSTRUCTIONS
Continua todo colt medicinas    Va a lab para chavo en ayunas y orina - tiene pruebas para mi y para Dr. Lemus    Llama con la oficina de ojos para enviar juan copia de luna examen acqui - mi fax es [phone]    Regresa en 6 meses

## 2025-04-16 NOTE — TELEPHONE ENCOUNTER
Called [facility] this am to verify if patient has been seen there. Verified. Faxed over release of information to the office to [phone].

## 2025-04-16 NOTE — PROGRESS NOTES
Subjective:   Sherry Negrete is a 79 year old female who presents for a MA (Medicare Advantage) Supervisit (Once per calendar year).     Encounter for annual health examination  -due for wellness    Type 2 diabetes mellitus without complication, without long-term current use of insulin (HCC)  Type 2 diabetes mellitus with stage 3a chronic kidney disease, without long-term current use of insulin (HCC)  -Last A1c value was 6.7% done 4/16/2025.  -medication issues: none  -last eye exam: 08/05/2022  -denies hyper or hypo glycemic symptoms none    Essential hypertension  -at goal    Hypercholesterolemia  -tolerating statin    Chronic kidney disease, stage 3a (HCC)  -stable on last labs    Gastroesophageal reflux disease without esophagitis  -stable on PPI    Osteoporosis, unspecified osteoporosis type, unspecified pathological fracture presence  -stable on alendronate    Pulmonary Fibrosis  -no new issues  -denies SOB          History/Other:   Fall Risk Assessment:   She has been screened for Falls and is High Risk. Fall Prevention information provided to patient in After Visit Summary.    Do you feel unsteady when standing or walking?: No  Do you worry about falling?: Yes (sometimes)  Have you fallen in the past year?: No     Cognitive Assessment:   She had a completely normal cognitive assessment - see flowsheet entries     Functional Ability/Status:   Sherry Negrete has a completely normal functional assessment. See flowsheet for details.        Depression Screening (PHQ-2/PHQ-9): PHQ-2 SCORE: 0  , done 4/16/2025   Trouble falling or staying asleep, or sleeping too much: 1     If you checked off any problems, how difficult have these problems made it for you to do your work, take care of things at home, or get along with other people?: Not difficult at all         Advanced Directives:   She does NOT have a Living Will. [Do you have a living will?: No]  She does NOT have a Power of  for Health Care. [Do you  have a healthcare power of ?: No]  Discussed Advance Care Planning with patient (and family/surrogate if present). Standard forms made available to patient in After Visit Summary.      Patient Active Problem List   Diagnosis    Type 2 diabetes mellitus without complication, without long-term current use of insulin (HCC)    Rash    Hypercholesterolemia    Essential hypertension    Perennial allergic rhinitis with seasonal variation    Chronic kidney disease, stage 3a (HCC)    Protein-calorie malnutrition, unspecified severity ([facility])    Type 2 diabetes mellitus with diabetic chronic kidney disease (HCC)    Gastroesophageal reflux disease without esophagitis    Osteoporosis     Allergies:  She is allergic to adhesive tape.    Current Medications:  Outpatient Medications Marked as Taking for the 4/16/25 encounter (Office Visit) with Brandon Bar MD   Medication Sig    famotidine 20 MG Oral Tab Take 1 tablet (20 mg total) by mouth 2 (two) times daily as needed for Heartburn.    alendronate 70 MG Oral Tab Take 1 tablet (70 mg total) by mouth once a week.    atorvastatin 10 MG Oral Tab Take 1 tablet (10 mg total) by mouth every evening.    lisinopril 10 MG Oral Tab Take 1 tablet (10 mg total) by mouth every evening.    metFORMIN  MG Oral Tablet 24 Hr Take 1 tablet (500 mg total) by mouth daily with breakfast.       Medical History:  She  has a past medical history of Anxiety (10/2022), Diabetes (HCC), Esophageal reflux (11/2020), Essential hypertension (2010), High blood pressure, History of 2019 novel coronavirus disease (COVID-19), HTN (hypertension), Hypercholesteremia, Hyperlipidemia (2010), Prediabetes, and Visual impairment.  Surgical History:  She  has a past surgical history that includes appendectomy; Cataract extraction; appendectomy; colonoscopy (N/A, 04/29/2021); colonoscopy (N/A, 09/20/2022); and cataract (2015).   Family History:  Her family history includes Diabetes in her brother and  sister; Hypertension in her brother and sister.  Social History:  She  reports that she has quit smoking. She has never used smokeless tobacco. She reports that she does not drink alcohol and does not use drugs.    Tobacco:  She smoked tobacco in the past but quit greater than 12 months ago.  Tobacco Use[1]       CAGE Alcohol Screen:   CAGE screening score of 0 on 4/16/2025, showing low risk of alcohol abuse.      Patient Care Team:  Brandon Bar MD as PCP - General (Family Medicine)    Review of Systems     Negative except above    Objective:   Physical Exam  General Appearance:  Alert, cooperative, no distress, appears stated age   Head:  Neck:  Normocephalic, without obvious abnormality, atraumatic  Subtle swelling in lower right and left aspect of neck   Eyes:  PERRL   Ears:  Normal TM's and external ear canals, both ears   Lungs:   Clear to auscultation bilaterally, respirations unlabored   Heart:  Regular rate and rhythm   Extremities: Extremities normal, no cyanosis or edema   Skin: promient veins in anterior chest wall (see picture)   Neurologic: No appreciable abnormality     Bilateral barefoot skin diabetic exam is normal, visualized feet and the appearance is normal.  Bilateral monofilament/sensation of both feet is normal.  Pulsation pedal pulse exam of both lower legs/feet is normal as well.      /68 (BP Location: Left arm, Patient Position: Sitting, Cuff Size: adult)   Pulse 63   Temp 97.6 °F (36.4 °C)   Resp 16   Ht 4' 10.25\" (1.48 m)   Wt 115 lb (52.2 kg)   SpO2 99%   BMI 23.83 kg/m²  Estimated body mass index is 23.83 kg/m² as calculated from the following:    Height as of this encounter: 4' 10.25\" (1.48 m).    Weight as of this encounter: 115 lb (52.2 kg).    Medicare Hearing Assessment:   Hearing Screening    Screening Method: Finger Rub  Finger Rub Result: Pass             Assessment & Plan:   Sherry Negrete is a 79 year old female who presents for a Medicare Assessment.      Encounter for annual health examination  -reviewed age appropriate screening  -due for DEXA in 2027  -colonoscopy up to date  -up to date on PNA vaccine  -will think about zoster    Type 2 diabetes mellitus without complication, without long-term current use of insulin (HCC)  Type 2 diabetes mellitus with stage 3a chronic kidney disease, without long-term current use of insulin (HCC)  -normal foot exam  -up to date on eye exam  -A1c ~6 today    Essential hypertension  -at goal    Hypercholesterolemia  -check lipids    Protein-calorie malnutrition, unspecified severity (HCC)  -diet better, will continue to monitor    Chronic kidney disease, stage 3a (HCC)  -stable on last labs - will continue to monitor    Perennial allergic rhinitis with seasonal variation  Stable, will continue to monitor    Osteoporosis, unspecified osteoporosis type, unspecified pathological fracture presence  -stable on alendronate  -check dexa in 2 yrs    Gastroesophageal reflux disease without esophagitis  -better   -c/w ppi prn and diet changes, f/u prn    Pulmonary fibrosis (HCC)  -breathing remains stable  -denies SOB with exertion  -will continue to monitor        The patient indicates understanding of these issues and agrees to the plan.  Reinforced healthy diet, lifestyle, and exercise.      No follow-ups on file.     VI GARCIA MD, 8/23/2022     Supplementary Documentation:   General Health:  In the past six months, have you lost more than 10 pounds without trying?: 2 - No  Has your appetite been poor?: No  Type of Diet: Low Salt, Balanced  How does the patient maintain a good energy level?: Daily Walks  How would you describe your daily physical activity?: Light  How would you describe your current health state?: Good  How do you maintain positive mental well-being?: Puzzles, Visiting Family  On a scale of 0 to 10, with 0 being no pain and 10 being severe pain, what is your pain level?: 0 - (None)  In the past six months, have  you experienced urine leakage?: 0-No  At any time do you feel concerned for the safety/well-being of yourself and/or your children, in your home or elsewhere?: No  Have you had any immunizations at another office such as Influenza, Hepatitis B, Tetanus, or Pneumococcal?: Jennifer Negrete's SCREENING SCHEDULE   Tests on this list are recommended by your physician but may not be covered, or covered at this frequency, by your insurer.   Please check with your insurance carrier before scheduling to verify coverage.   PREVENTATIVE SERVICES FREQUENCY &  COVERAGE DETAILS LAST COMPLETION DATE   Diabetes Screening    Fasting Blood Sugar /  Glucose    One screening every 12 months if never tested or if previously tested but not diagnosed with pre-diabetes   One screening every 6 months if diagnosed with pre-diabetes Lab Results   Component Value Date     (H) 10/16/2024        Cardiovascular Disease Screening    Lipid Panel  Cholesterol  Lipoprotein (HDL)  Triglycerides Covered every 5 years for all Medicare beneficiaries without apparent signs or symptoms of cardiovascular disease Lab Results   Component Value Date    CHOLEST 191 10/16/2024    HDL 52 10/16/2024    LDL 97 10/16/2024    TRIG 250 (H) 10/16/2024         Electrocardiogram (EKG)   Covered if needed at Welcome to Medicare, and non-screening if indicated for medical reasons 09/04/2020      Ultrasound Screening for Abdominal Aortic Aneurysm (AAA) Covered once in a lifetime for one of the following risk factors    Men who are 65-75 years old and have ever smoked    Anyone with a family history -     Colorectal Cancer Screening  Covered for ages 50-85; only need ONE of the following:    Colonoscopy   Covered every 10 years    Covered every 2 years if patient is at high risk or previous colonoscopy was abnormal 09/20/2022    Health Maintenance   Topic Date Due    Colorectal Cancer Screening  09/20/2025       Flexible Sigmoidoscopy   Covered every 4 years -     Fecal Occult Blood Test Covered annually -   Bone Density Screening    Bone density screening    Covered every 2 years after age 65 if diagnosed with risk of osteoporosis or estrogen deficiency.    Covered yearly for long-term glucocorticoid medication use (Steroids) Last Dexa Scan:    XR DEXA BONE DENSITOMETRY (CPT=77080) 10/22/2024      No recommendations at this time   Pap and Pelvic    Pap   Covered every 2 years for women at normal risk; Annually if at high risk -  No recommendations at this time    Chlamydia Annually if high risk -  No recommendations at this time   Screening Mammogram    Mammogram     Recommend annually for all female patients aged 40 and older    One baseline mammogram covered for patients aged 35-39 -    Health Maintenance   Topic Date Due    Mammogram  Discontinued       Immunizations    Influenza Covered once per flu season  Please get every year 10/16/2024  No recommendations at this time    Pneumococcal Each vaccine ([id number] & [id number]) covered once after 65 Prevnar 13: 12/30/2019    Bikefrazk97: -     No recommendations at this time    Hepatitis B One screening covered for patients with certain risk factors   -  No recommendations at this time    Tetanus Toxoid Not covered by Medicare Part B unless medically necessary (cut with metal); may be covered with your pharmacy prescription benefits -    Tetanus, Diptheria and Pertusis TD and TDaP Not covered by Medicare Part B -  No recommendations at this time    Zoster Not covered by Medicare Part B; may be covered with your pharmacy  prescription benefits -  Zoster Vaccines(1 of 2) Never done     Diabetes      Hemoglobin A1C Annually; if last result is elevated, may be asked to retest more frequently.    Medicare covers every 3 months Lab Results   Component Value Date     (H) 08/26/2022    A1C 6.7 (A) 04/16/2025       No recommendations at this time    Creat/alb ratio Annually Lab Results   Component Value Date     MICROALBCREA  04/16/2025      Comment:      Unable to calculate due to Urine Microalbumin <0.3 mg/dL           LDL Annually Lab Results   Component Value Date    LDL 97 10/16/2024       Dilated Eye Exam Annually Last Diabetic Eye Exam:  Last Dilated Eye Exam: 07/22/24  Eye Exam shows Diabetic Retinopathy?: No       Annual Monitoring of Persistent Medications (ACE/ARB, digoxin diuretics, anticonvulsants)    Potassium Annually Lab Results   Component Value Date    K 4.5 10/16/2024         Creatinine   Annually Lab Results   Component Value Date    CREATSERUM 0.83 10/16/2024         BUN Annually Lab Results   Component Value Date    BUN 13 10/16/2024       Drug Serum Conc Annually No results found for: \"DIGOXIN\", \"DIG\", \"VALP\"                Overall 60 minutes was spent on this encounter, 40 mins spent reviewing and providing care coordination for these conditions: htn, lipid, osteoporosis, vascular congestion  20  minutes was spent reviewing wellness elements and providing age appropriate screenings.         [1]   Social History  Tobacco Use   Smoking Status Former   Smokeless Tobacco Never   Tobacco Comments    Quit 15 years ago

## 2025-04-17 ENCOUNTER — LAB ENCOUNTER (OUTPATIENT)
Dept: LAB | Age: 80
End: 2025-04-17
Attending: FAMILY MEDICINE
Payer: MEDICARE

## 2025-04-17 ENCOUNTER — MED REC SCAN ONLY (OUTPATIENT)
Dept: FAMILY MEDICINE CLINIC | Facility: CLINIC | Age: 80
End: 2025-04-17

## 2025-04-17 DIAGNOSIS — E78.00 HYPERCHOLESTEROLEMIA: ICD-10-CM

## 2025-04-17 LAB
BASOPHILS # BLD AUTO: 0.09 X10(3) UL (ref 0–0.2)
BASOPHILS NFR BLD AUTO: 1.4 %
EOSINOPHIL # BLD AUTO: 0.46 X10(3) UL (ref 0–0.7)
EOSINOPHIL NFR BLD AUTO: 7 %
ERYTHROCYTE [DISTWIDTH] IN BLOOD BY AUTOMATED COUNT: 13.6 %
HCT VFR BLD AUTO: 41 % (ref 35–48)
HGB BLD-MCNC: 13.1 G/DL (ref 12–16)
IMM GRANULOCYTES # BLD AUTO: 0.03 X10(3) UL (ref 0–1)
IMM GRANULOCYTES NFR BLD: 0.5 %
LYMPHOCYTES # BLD AUTO: 1.86 X10(3) UL (ref 1–4)
LYMPHOCYTES NFR BLD AUTO: 28.4 %
MCH RBC QN AUTO: 29.4 PG (ref 26–34)
MCHC RBC AUTO-ENTMCNC: 32 G/DL (ref 31–37)
MCV RBC AUTO: 92.1 FL (ref 80–100)
MONOCYTES # BLD AUTO: 0.43 X10(3) UL (ref 0.1–1)
MONOCYTES NFR BLD AUTO: 6.6 %
NEUTROPHILS # BLD AUTO: 3.69 X10 (3) UL (ref 1.5–7.7)
NEUTROPHILS # BLD AUTO: 3.69 X10(3) UL (ref 1.5–7.7)
NEUTROPHILS NFR BLD AUTO: 56.1 %
PLATELET # BLD AUTO: 202 10(3)UL (ref 150–450)
RBC # BLD AUTO: 4.45 X10(6)UL (ref 3.8–5.3)
WBC # BLD AUTO: 6.6 X10(3) UL (ref 4–11)

## 2025-04-17 PROCEDURE — 36415 COLL VENOUS BLD VENIPUNCTURE: CPT

## 2025-04-17 PROCEDURE — 85025 COMPLETE CBC W/AUTO DIFF WBC: CPT

## (undated) DEVICE — 1200CC GUARDIAN II: Brand: GUARDIAN

## (undated) DEVICE — TRAP 4 CPTR CHMBR N EZ INLN

## (undated) DEVICE — Device: Brand: DEFENDO AIR/WATER/SUCTION AND BIOPSY VALVE

## (undated) DEVICE — MEDI-VAC SUCTION HANDLE REGULAR CAPACITY: Brand: CARDINAL HEALTH

## (undated) DEVICE — FILTERLINE NASAL ADULT O2/CO2

## (undated) DEVICE — FORCEP RADIAL JAW 4

## (undated) DEVICE — MEDI-VAC NON-CONDUCTIVE SUCTION TUBING: Brand: CARDINAL HEALTH

## (undated) DEVICE — 3M™ RED DOT™ MONITORING ELECTRODE WITH FOAM TAPE AND STICKY GEL, 50/BAG, 20/CASE, 72/PLT 2570: Brand: RED DOT™

## (undated) DEVICE — SNARE CAPTIFLEX MICRO-OVL OLY

## (undated) DEVICE — ENDOSCOPY PACK - LOWER: Brand: MEDLINE INDUSTRIES, INC.

## (undated) DEVICE — FORCEP BIOPSY RJ4 LG CAP W/ND

## (undated) DEVICE — TRAP SPEC REMOVAL ETRAP 15CM

## (undated) NOTE — Clinical Note
Pt declined changing from Fosamax. Gave her more reading about Prolia however. She will get additional testing and think about it.

## (undated) NOTE — LETTER
10/02/20        [facility]  [address]  [id number]      Dear Alvin Aguila records indicate that you have outstanding lab work and or testing that was ordered for you and has not yet been completed:        Hgb A1C      CBC With Diffe

## (undated) NOTE — LETTER
JOAN Notifier: [employer]   JAN. Patient Name: Damián Mcwilliams.  Identification Number: [id number]      Advance Beneficiary Notice of Noncoverage (ABN)  NOTE:  If Medicare doesn’t pay for D pap smear, pelvic and breast exam below, you may have to p ? OPTION 2. I want the D. listed above, but do not bill Medicare. You may ask to be paid now as I am responsible for payment. I cannot appeal if Medicare is not billed. ? OPTION 3. I don’t want the D. listed above.  I understand with this choice  I am no